# Patient Record
Sex: FEMALE | Race: OTHER | HISPANIC OR LATINO
[De-identification: names, ages, dates, MRNs, and addresses within clinical notes are randomized per-mention and may not be internally consistent; named-entity substitution may affect disease eponyms.]

---

## 2022-03-09 PROBLEM — Z00.00 ENCOUNTER FOR PREVENTIVE HEALTH EXAMINATION: Status: ACTIVE | Noted: 2022-03-09

## 2022-03-28 ENCOUNTER — NON-APPOINTMENT (OUTPATIENT)
Age: 82
End: 2022-03-28

## 2022-03-28 ENCOUNTER — APPOINTMENT (OUTPATIENT)
Dept: CARDIOTHORACIC SURGERY | Facility: CLINIC | Age: 82
End: 2022-03-28
Payer: MEDICARE

## 2022-03-28 ENCOUNTER — OUTPATIENT (OUTPATIENT)
Dept: OUTPATIENT SERVICES | Facility: HOSPITAL | Age: 82
LOS: 1 days | End: 2022-03-28
Payer: MEDICARE

## 2022-03-28 ENCOUNTER — APPOINTMENT (OUTPATIENT)
Dept: CT IMAGING | Facility: HOSPITAL | Age: 82
End: 2022-03-28

## 2022-03-28 VITALS
WEIGHT: 136 LBS | HEART RATE: 88 BPM | BODY MASS INDEX: 28.55 KG/M2 | DIASTOLIC BLOOD PRESSURE: 71 MMHG | HEIGHT: 58 IN | SYSTOLIC BLOOD PRESSURE: 169 MMHG | TEMPERATURE: 97.6 F | OXYGEN SATURATION: 94 % | RESPIRATION RATE: 17 BRPM

## 2022-03-28 DIAGNOSIS — Z86.79 PERSONAL HISTORY OF OTHER DISEASES OF THE CIRCULATORY SYSTEM: ICD-10-CM

## 2022-03-28 DIAGNOSIS — Z78.9 OTHER SPECIFIED HEALTH STATUS: ICD-10-CM

## 2022-03-28 LAB — POCT ISTAT CREATININE: 0.7 MG/DL — SIGNIFICANT CHANGE UP (ref 0.5–1.3)

## 2022-03-28 PROCEDURE — 99203 OFFICE O/P NEW LOW 30 MIN: CPT

## 2022-03-28 PROCEDURE — 75573 CT HRT C+ STRUX CGEN HRT DS: CPT

## 2022-03-28 PROCEDURE — 75573 CT HRT C+ STRUX CGEN HRT DS: CPT | Mod: 26

## 2022-03-28 PROCEDURE — 82565 ASSAY OF CREATININE: CPT

## 2022-03-28 PROCEDURE — 74174 CTA ABD&PLVS W/CONTRAST: CPT | Mod: 26

## 2022-03-28 PROCEDURE — 74174 CTA ABD&PLVS W/CONTRAST: CPT

## 2022-03-29 PROBLEM — Z78.9 DOES NOT USE TOBACCO: Status: ACTIVE | Noted: 2022-03-29

## 2022-03-29 PROBLEM — Z86.79 HISTORY OF HYPERTENSION: Status: RESOLVED | Noted: 2022-03-29 | Resolved: 2022-03-29

## 2022-03-29 RX ORDER — MELOXICAM 7.5 MG/1
7.5 TABLET ORAL
Refills: 0 | Status: ACTIVE | COMMUNITY

## 2022-03-29 RX ORDER — NIFEDIPINE 90 MG
90 TABLET, EXTENDED RELEASE ORAL DAILY
Refills: 0 | Status: ACTIVE | COMMUNITY

## 2022-03-29 RX ORDER — ASPIRIN 81 MG
81 TABLET, DELAYED RELEASE (ENTERIC COATED) ORAL DAILY
Refills: 0 | Status: ACTIVE | COMMUNITY

## 2022-03-30 NOTE — REVIEW OF SYSTEMS
[SOB] : shortness of breath [Dyspnea on exertion] : dyspnea during exertion [Chest Discomfort] : no chest discomfort [Lower Ext Edema] : lower extremity edema [Leg Claudication] : no intermittent leg claudication [Palpitations] : no palpitations [Orthopnea] : no orthopnea [PND] : no PND [Syncope] : no syncope [Negative] : Heme/Lymph

## 2022-03-30 NOTE — PHYSICAL EXAM
[Well Developed] : well developed [Well Nourished] : well nourished [No Acute Distress] : no acute distress [Normal Conjunctiva] : normal conjunctiva [Normal S1, S2] : normal S1, S2 [Clear Lung Fields] : clear lung fields [Good Air Entry] : good air entry [No Respiratory Distress] : no respiratory distress  [Soft] : abdomen soft [Non Tender] : non-tender [No Masses/organomegaly] : no masses/organomegaly [Normal Bowel Sounds] : normal bowel sounds [Normal Gait] : normal gait [No Edema] : no edema [No Cyanosis] : no cyanosis [No Clubbing] : no clubbing [No Varicosities] : no varicosities [Moves all extremities] : moves all extremities [No Focal Deficits] : no focal deficits [Normal Speech] : normal speech [Alert and Oriented] : alert and oriented [Normal memory] : normal memory [de-identified] : pleasant elderly woman [de-identified] : +CHELO grade III/VI heard best at [facility]

## 2022-03-30 NOTE — HISTORY OF PRESENT ILLNESS
[FreeTextEntry1] : \par 81 year old Monegasque-speaking Female with newly diagnosed severe AS, presented to her cardiologist, Dr. Go Curiel, with complaints of worsening ROE, fatigue, dizziness and near-syncope, and reduced exercise capacity. She can walk 1/2 block before needing to stop and rest. She also admits to occasional chest tightness, described as pressure. An echocardiogram performed 3/7/22 revealed severe AS with peak velocity 4m/s and ROSAURA 0.8cm2. . \par \par Patient seen with her niece, Pacific  used ID# [id number]. Patient reports intermittent SOB, worse with ambulation, needs to rest after 1/2 block. Reports being very active pre-pandemic, but now has not been very active at home. Also admits to LE edema bilaterally, however reports this is chronic. She states she used to take "a diuretic" (cannot recall which one), but stopped taking it because she felt it did not help the edema. Patient otherwise denies chest pain at this time, SOB at rest, orthopnea, worsening LE edema or weight gain, dizziness or pre-syncope. \par \par Patient ambulates with a cane, and lives at home with her niece and cousin. Her niece is her home health aid and helps the patient with ADLs.

## 2022-04-01 NOTE — PHYSICAL EXAM
[Well Developed] : well developed [Well Nourished] : well nourished [No Acute Distress] : no acute distress [Normal Conjunctiva] : normal conjunctiva [Normal S1, S2] : normal S1, S2 [Clear Lung Fields] : clear lung fields [Good Air Entry] : good air entry [No Respiratory Distress] : no respiratory distress  [Soft] : abdomen soft [Non Tender] : non-tender [No Masses/organomegaly] : no masses/organomegaly [Normal Bowel Sounds] : normal bowel sounds [Normal Gait] : normal gait [No Edema] : no edema [No Cyanosis] : no cyanosis [No Clubbing] : no clubbing [No Varicosities] : no varicosities [Moves all extremities] : moves all extremities [No Focal Deficits] : no focal deficits [Normal Speech] : normal speech [Alert and Oriented] : alert and oriented [Normal memory] : normal memory [de-identified] : +CHELO grade III/VI heard best at [facility] [de-identified] : pleasant elderly woman

## 2022-04-01 NOTE — HISTORY OF PRESENT ILLNESS
[FreeTextEntry1] : \par 81 year old Haitian-speaking Female with newly diagnosed severe AS, presented to her cardiologist, Dr. Go Curiel, with complaints of worsening ROE, fatigue, dizziness and near-syncope, and reduced exercise capacity. She can walk 1/2 block before needing to stop and rest. She also admits to occasional chest tightness, described as pressure. An echocardiogram performed 3/7/22 revealed severe AS with peak velocity 4m/s and ROSAURA 0.8cm2. Patient referred to [facility], Dr. Mcdermott, for further evaluation of her AS. \par \par Patient seen with her niece, Pacific  used ID# [id number]. Patient reports intermittent SOB, worse with ambulation, needs to rest after 1/2 block. Reports being very active pre-pandemic, but now has not been very active at home. Also admits to LE edema bilaterally, however reports this is chronic. She states she used to take "a diuretic" (cannot recall which one), but stopped taking it because she felt it did not help the edema. Patient otherwise denies chest pain at this time, SOB at rest, orthopnea, worsening LE edema or weight gain, dizziness or pre-syncope. \par \par Patient ambulates with a cane, and lives at home with her niece and cousin. Her niece is her home health aid and helps the patient with ADLs.

## 2022-04-07 VITALS
RESPIRATION RATE: 16 BRPM | HEIGHT: 55 IN | TEMPERATURE: 98 F | WEIGHT: 138.89 LBS | HEART RATE: 87 BPM | SYSTOLIC BLOOD PRESSURE: 176 MMHG | DIASTOLIC BLOOD PRESSURE: 72 MMHG | OXYGEN SATURATION: 97 %

## 2022-04-07 RX ORDER — CHLORHEXIDINE GLUCONATE 213 G/1000ML
1 SOLUTION TOPICAL ONCE
Refills: 0 | Status: DISCONTINUED | OUTPATIENT
Start: 2022-04-12 | End: 2022-04-27

## 2022-04-07 NOTE — H&P ADULT - NSHPLABSRESULTS_GEN_ALL_CORE
12.0   10.05 )-----------( 344      ( 12 Apr 2022 14:37 )             36.2       04-12    136  |  100  |  12  ----------------------------<  102<H>  4.0   |  25  |  0.75    Ca    9.4      12 Apr 2022 14:37    TPro  8.3  /  Alb  3.7  /  TBili  0.2  /  DBili  x   /  AST  23  /  ALT  13  /  AlkPhos  108  04-12      PT/INR - ( 12 Apr 2022 14:37 )   PT: 12.2 sec;   INR: 1.02          PTT - ( 12 Apr 2022 14:37 )  PTT:30.0 sec    CARDIAC MARKERS ( 12 Apr 2022 14:37 )  x     / x     / 113 U/L / x     / 1.5 ng/mL      EKG: sinus rhythm 38bpm, PACs, no acute ischemia

## 2022-04-07 NOTE — H&P ADULT - NSICDXPASTSURGICALHX_GEN_ALL_CORE_FT
PAST SURGICAL HISTORY:  Fixation hardware in leg History of leg surgery with metal placed     PAST SURGICAL HISTORY:  Fixation hardware in leg History of leg surgery with metal placed    History of

## 2022-04-07 NOTE — H&P ADULT - HISTORY OF PRESENT ILLNESS
COVID:  Cardiologist: Dr. Stiles/Dr. Mcdermott  Pharmacy:  Escort:     **skeleton**    82 yo Paraguayan speaking female with PMHx of HTN, dCHF, newly diagnosed severe AS, chronic b/l LE edema presented to her cardiologist, Dr. Stiles complaints of worsening ROE, fatigue, dizzines and near-syncope, and reduced exercise capacity. She can walk 1/2 block before needing to stop and rest. Associate with occasional chest tightness/pressure. Denies CP, SOB, orthopnea, PND, worsening LE edema, palpitations, dizziness. ECHO 3/7/22: severe AS with peak velocity 4m/s and ROSAURA 0.8cm2, concentric LVH with normal LV function, mild MR, AR, TR. CT heart 3/28/22: severe stenosis pLAD, dense calcific plaque noted in pRCA, calcified trileaflet aortic valve. CT Angio Abdomen and pelvis 3/28/2022: aortic valvular calcification, extensive calcific atherosclerotic disease of the aorta. Mild stenosis of R ICA. Cholelithiasis. CBD stent in place.     In light of pt's risk factors, CCS Anginal Class Equivalent III symptoms, pt referred for R/LHC for pre-op clearance for planned TAVR.    Covid: 4/8/22: [facility] /Clinic -patient will bring results    Cardiologist: Dr. Stiles   Pharmacy: [facility]- [address] [facility]   Escort: Niece      Patient will bring meds- Verify meds      82 yo Uzbek speaking female with PMHx of HTN, Hyperlipidemia, Diastolic CHF (LVEF 60% by echo 3/7/22), newly diagnosed severe AS, chronic b/l LE edema, Glaucoma, Nephrolithiasis s/p Lithotripsy, Arthritis, ? Vertigo who presented to her cardiologist, Dr. Stiles complaining of worsening ROE, fatigue, dizziness, near-syncope, and reduced exercise capacity. Patient reports ROE after ambulating ½ block relieved with rest with occasional chest tightness /pressure (CCS Angina Class III Equivalent Symptoms). Symptoms have been present for 3 months and occur primarily with exertion but she admits to occasional SOB at rest with laying down as well. Patient reports dizziness but denies orthopnea, PND, palpitations, diaphoresis, N/V, syncope, fever, chills, cough. She admits to chronic BLE edema for which she was previously on a diuretic (she does not recall which one-appears to be Furosemide per Surescripts) but stopped the medication as she felt it did not help the edema. Patient was referred to Dr. Mcdermott for further evaluation of AS and TAVR work-up.      ECHO 3/7/22: severe AS with peak velocity 4m/s and ROSAURA 0.8cm2, Aortic gradient 60 mm Hg, short axis views highly suggestive of bicuspid valve, concentric LVH with normal LV function, mitral annular calcification, mild MR, AR, TR. CT heart Congenital 3/28/22: severe stenosis pLAD, dense calcific plaque noted in pRCA, calcified trileaflet aortic valve. CT Angio Abdomen and pelvis 3/28/2022: aortic valvular calcification, extensive calcific atherosclerotic disease of the aorta. Mild stenosis of R ICA. Cholelithiasis. CBD stent in place.      In light of pt's risk factors, CCS Angina Class III Equivalent symptoms, and newly diagnosed severe AS pt referred for [facility] as part of pre-op work-up for TAVR.     Covid: 4/8/22: [facility] /Clinic -patient will bring results    Cardiologist: Dr. Stlies   Pharmacy: [facility]- [address] [facility]   Escort: Niece      Patient will bring meds- Verify meds      NEEDS ECHO AND CAROTID US: PRE OP FOR TAVR (BOTH ORDERED)    80 yo Irish speaking female with PMHx of HTN, Hyperlipidemia, Diastolic CHF (LVEF 60% by echo 3/7/22), newly diagnosed severe AS, chronic b/l LE edema, Glaucoma, Nephrolithiasis s/p Lithotripsy, Arthritis, ? Vertigo who presented to her cardiologist, Dr. Stiles complaining of worsening ROE, fatigue, dizziness, near-syncope, and reduced exercise capacity. Patient reports ROE after ambulating ½ block relieved with rest with occasional chest tightness /pressure (CCS Angina Class III Equivalent Symptoms). Symptoms have been present for 3 months and occur primarily with exertion but she admits to occasional SOB at rest with laying down as well. Patient reports dizziness but denies orthopnea, PND, palpitations, diaphoresis, N/V, syncope, fever, chills, cough. She admits to chronic BLE edema for which she was previously on a diuretic (she does not recall which one-appears to be Furosemide per Surescripts) but stopped the medication as she felt it did not help the edema. Patient was referred to Dr. Mcdermott for further evaluation of AS and TAVR work-up.      ECHO 3/7/22: severe AS with peak velocity 4m/s and ROSAURA 0.8cm2, Aortic gradient 60 mm Hg, short axis views highly suggestive of bicuspid valve, concentric LVH with normal LV function, mitral annular calcification, mild MR, AR, TR. CT heart Congenital 3/28/22: severe stenosis pLAD, dense calcific plaque noted in pRCA, calcified trileaflet aortic valve. CT Angio Abdomen and pelvis 3/28/2022: aortic valvular calcification, extensive calcific atherosclerotic disease of the aorta. Mild stenosis of R ICA. Cholelithiasis. CBD stent in place.      In light of pt's risk factors, CCS Angina Class III Equivalent symptoms, and newly diagnosed severe AS pt referred for [facility] as part of pre-op work-up for TAVR.     Covid: negative 4/8/22    Cardiologist: Dr. Stiles   Pharmacy: [facility]- [address] [place] [address]; meds confirmed with pill bottles at bedside  Escort: Niece      82 yo Yoruba speaking female with PMHx of HTN, Hyperlipidemia, Diastolic CHF (LVEF 60% by echo 3/7/22), newly diagnosed severe AS, chronic b/l LE edema, who presented to her cardiologist, Dr. Stiles complaining of worsening ROE, fatigue, dizziness, near-syncope, and reduced exercise capacity. Patient reports ROE after ambulating ½ block relieved with rest with occasional chest tightness /pressure (CCS Angina Class III Equivalent Symptoms). Symptoms have been present for 3 months and occur primarily with exertion but she admits to occasional SOB at rest with laying down as well. Patient reports dizziness but denies orthopnea, PND, palpitations, diaphoresis, N/V, syncope, fever, chills, cough. She admits to chronic BLE edema for which she was previously on a diuretic (she does not recall which one-appears to be Furosemide per Surescripts) but stopped the medication as she felt it did not help the edema. Patient was referred to Dr. Mcdermott for further evaluation of AS and TAVR work-up.      ECHO 3/7/22: severe AS with peak velocity 4m/s and ROSAURA 0.8cm2, Aortic gradient 60 mm Hg, short axis views highly suggestive of bicuspid valve, concentric LVH with normal LV function, mitral annular calcification, mild MR, AR, TR. CT heart Congenital 3/28/22: severe stenosis pLAD, dense calcific plaque noted in pRCA, calcified trileaflet aortic valve. CT Angio Abdomen and pelvis 3/28/2022: aortic valvular calcification, extensive calcific atherosclerotic disease of the aorta. Mild stenosis of R ICA. Cholelithiasis. CBD stent in place.      In light of pt's risk factors, CCS Angina Class III Equivalent symptoms, and newly diagnosed severe AS pt referred for [facility] as part of pre-op work-up for TAVR.

## 2022-04-07 NOTE — H&P ADULT - ASSESSMENT
80 yo Ivorian speaking female with PMHx of HTN, Hyperlipidemia, Diastolic CHF (LVEF 60% by echo 3/7/22), newly diagnosed severe AS, chronic b/l LE edema, who presents for R/LHC in light of pt's risk factors, CCS Angina Class III Equivalent symptoms, and newly diagnosed severe AS with plan for TAVR.     -ASA IV, Mallampati IV  -suitable candidate for moderate sedation  -DAPT: continue aspirin 81mg daily. No plavix load 2/2 plan for TAVR  -IVF: none 2/2 severe AS  -cath consent obtained via Language Line ID # [id number]    Risks & benefits of procedure and alternative therapy have been explained to the patient including but not limited to: allergic reaction, bleeding w/possible need for blood transfusion, infection, renal and vascular compromise, limb damage, arrhythmia, stroke, vessel dissection/perforation, Myocardial infarction, emergent CABG.

## 2022-04-07 NOTE — H&P ADULT - NSICDXPASTMEDICALHX_GEN_ALL_CORE_FT
PAST MEDICAL HISTORY:  Aortic stenosis, severe     Diastolic CHF     HTN (hypertension)      PAST MEDICAL HISTORY:  Aortic stenosis, severe     Arthritis     Diastolic CHF     Glaucoma     HTN (hypertension)     Nephrolithiasis

## 2022-04-12 ENCOUNTER — OUTPATIENT (OUTPATIENT)
Dept: OUTPATIENT SERVICES | Facility: HOSPITAL | Age: 82
LOS: 1 days | Discharge: HOME | End: 2022-04-12
Payer: MEDICARE

## 2022-04-12 DIAGNOSIS — Z96.7 PRESENCE OF OTHER BONE AND TENDON IMPLANTS: Chronic | ICD-10-CM

## 2022-04-12 DIAGNOSIS — Z98.891 HISTORY OF UTERINE SCAR FROM PREVIOUS SURGERY: Chronic | ICD-10-CM

## 2022-04-12 LAB
A1C WITH ESTIMATED AVERAGE GLUCOSE RESULT: 6.2 % — HIGH (ref 4–5.6)
ALBUMIN SERPL ELPH-MCNC: 3.7 G/DL — SIGNIFICANT CHANGE UP (ref 3.3–5)
ALP SERPL-CCNC: 108 U/L — SIGNIFICANT CHANGE UP (ref 40–120)
ALT FLD-CCNC: 13 U/L — SIGNIFICANT CHANGE UP (ref 10–45)
ANION GAP SERPL CALC-SCNC: 11 MMOL/L — SIGNIFICANT CHANGE UP (ref 5–17)
APTT BLD: 30 SEC — SIGNIFICANT CHANGE UP (ref 27.5–35.5)
AST SERPL-CCNC: 23 U/L — SIGNIFICANT CHANGE UP (ref 10–40)
BASOPHILS # BLD AUTO: 0.09 K/UL — SIGNIFICANT CHANGE UP (ref 0–0.2)
BASOPHILS NFR BLD AUTO: 0.9 % — SIGNIFICANT CHANGE UP (ref 0–2)
BILIRUB SERPL-MCNC: 0.2 MG/DL — SIGNIFICANT CHANGE UP (ref 0.2–1.2)
BUN SERPL-MCNC: 12 MG/DL — SIGNIFICANT CHANGE UP (ref 7–23)
CALCIUM SERPL-MCNC: 9.4 MG/DL — SIGNIFICANT CHANGE UP (ref 8.4–10.5)
CHLORIDE SERPL-SCNC: 100 MMOL/L — SIGNIFICANT CHANGE UP (ref 96–108)
CHOLEST SERPL-MCNC: 224 MG/DL — HIGH
CK MB CFR SERPL CALC: 1.5 NG/ML — SIGNIFICANT CHANGE UP (ref 0–6.7)
CK SERPL-CCNC: 113 U/L — SIGNIFICANT CHANGE UP (ref 25–170)
CO2 SERPL-SCNC: 25 MMOL/L — SIGNIFICANT CHANGE UP (ref 22–31)
CREAT SERPL-MCNC: 0.75 MG/DL — SIGNIFICANT CHANGE UP (ref 0.5–1.3)
EGFR: 79 ML/MIN/1.73M2 — SIGNIFICANT CHANGE UP
EOSINOPHIL # BLD AUTO: 0.1 K/UL — SIGNIFICANT CHANGE UP (ref 0–0.5)
EOSINOPHIL NFR BLD AUTO: 1 % — SIGNIFICANT CHANGE UP (ref 0–6)
ESTIMATED AVERAGE GLUCOSE: 131 MG/DL — HIGH (ref 68–114)
GLUCOSE SERPL-MCNC: 102 MG/DL — HIGH (ref 70–99)
HCT VFR BLD CALC: 36.2 % — SIGNIFICANT CHANGE UP (ref 34.5–45)
HDLC SERPL-MCNC: 61 MG/DL — SIGNIFICANT CHANGE UP
HGB BLD-MCNC: 12 G/DL — SIGNIFICANT CHANGE UP (ref 11.5–15.5)
IMM GRANULOCYTES NFR BLD AUTO: 0.2 % — SIGNIFICANT CHANGE UP (ref 0–1.5)
INR BLD: 1.02 — SIGNIFICANT CHANGE UP (ref 0.88–1.16)
LIPID PNL WITH DIRECT LDL SERPL: 138 MG/DL — HIGH
LYMPHOCYTES # BLD AUTO: 2.81 K/UL — SIGNIFICANT CHANGE UP (ref 1–3.3)
LYMPHOCYTES # BLD AUTO: 28 % — SIGNIFICANT CHANGE UP (ref 13–44)
MCHC RBC-ENTMCNC: 28.8 PG — SIGNIFICANT CHANGE UP (ref 27–34)
MCHC RBC-ENTMCNC: 33.1 GM/DL — SIGNIFICANT CHANGE UP (ref 32–36)
MCV RBC AUTO: 87 FL — SIGNIFICANT CHANGE UP (ref 80–100)
MONOCYTES # BLD AUTO: 1.02 K/UL — HIGH (ref 0–0.9)
MONOCYTES NFR BLD AUTO: 10.1 % — SIGNIFICANT CHANGE UP (ref 2–14)
NEUTROPHILS # BLD AUTO: 6.01 K/UL — SIGNIFICANT CHANGE UP (ref 1.8–7.4)
NEUTROPHILS NFR BLD AUTO: 59.8 % — SIGNIFICANT CHANGE UP (ref 43–77)
NON HDL CHOLESTEROL: 163 MG/DL — HIGH
NRBC # BLD: 0 /100 WBCS — SIGNIFICANT CHANGE UP (ref 0–0)
PLATELET # BLD AUTO: 344 K/UL — SIGNIFICANT CHANGE UP (ref 150–400)
POTASSIUM SERPL-MCNC: 4 MMOL/L — SIGNIFICANT CHANGE UP (ref 3.5–5.3)
POTASSIUM SERPL-SCNC: 4 MMOL/L — SIGNIFICANT CHANGE UP (ref 3.5–5.3)
PROT SERPL-MCNC: 8.3 G/DL — SIGNIFICANT CHANGE UP (ref 6–8.3)
PROTHROM AB SERPL-ACNC: 12.2 SEC — SIGNIFICANT CHANGE UP (ref 10.5–13.4)
RBC # BLD: 4.16 M/UL — SIGNIFICANT CHANGE UP (ref 3.8–5.2)
RBC # FLD: 13.3 % — SIGNIFICANT CHANGE UP (ref 10.3–14.5)
SODIUM SERPL-SCNC: 136 MMOL/L — SIGNIFICANT CHANGE UP (ref 135–145)
TRIGL SERPL-MCNC: 127 MG/DL — SIGNIFICANT CHANGE UP
WBC # BLD: 10.05 K/UL — SIGNIFICANT CHANGE UP (ref 3.8–10.5)
WBC # FLD AUTO: 10.05 K/UL — SIGNIFICANT CHANGE UP (ref 3.8–10.5)

## 2022-04-12 PROCEDURE — 93010 ELECTROCARDIOGRAM REPORT: CPT | Mod: 59

## 2022-04-12 PROCEDURE — 80053 COMPREHEN METABOLIC PANEL: CPT

## 2022-04-12 PROCEDURE — 99153 MOD SED SAME PHYS/QHP EA: CPT

## 2022-04-12 PROCEDURE — 85610 PROTHROMBIN TIME: CPT

## 2022-04-12 PROCEDURE — 85025 COMPLETE CBC W/AUTO DIFF WBC: CPT

## 2022-04-12 PROCEDURE — 99152 MOD SED SAME PHYS/QHP 5/>YRS: CPT

## 2022-04-12 PROCEDURE — 80061 LIPID PANEL: CPT

## 2022-04-12 PROCEDURE — 93460 R&L HRT ART/VENTRICLE ANGIO: CPT

## 2022-04-12 PROCEDURE — C1887: CPT

## 2022-04-12 PROCEDURE — 93005 ELECTROCARDIOGRAM TRACING: CPT

## 2022-04-12 PROCEDURE — 82553 CREATINE MB FRACTION: CPT

## 2022-04-12 PROCEDURE — C1760: CPT

## 2022-04-12 PROCEDURE — 85730 THROMBOPLASTIN TIME PARTIAL: CPT

## 2022-04-12 PROCEDURE — 93460 R&L HRT ART/VENTRICLE ANGIO: CPT | Mod: 26

## 2022-04-12 PROCEDURE — C1769: CPT

## 2022-04-12 PROCEDURE — C1894: CPT

## 2022-04-12 PROCEDURE — 83036 HEMOGLOBIN GLYCOSYLATED A1C: CPT

## 2022-04-12 PROCEDURE — 82550 ASSAY OF CK (CPK): CPT

## 2022-04-12 RX ORDER — LABETALOL HCL 100 MG
10 TABLET ORAL ONCE
Refills: 0 | Status: COMPLETED | OUTPATIENT
Start: 2022-04-12 | End: 2022-04-12

## 2022-04-12 RX ORDER — NETARSUDIL 0.2 MG/ML
1 SOLUTION/ DROPS OPHTHALMIC; TOPICAL
Qty: 0 | Refills: 0 | DISCHARGE

## 2022-04-12 RX ORDER — ATORVASTATIN CALCIUM 80 MG/1
1 TABLET, FILM COATED ORAL
Qty: 0 | Refills: 0 | DISCHARGE

## 2022-04-12 RX ORDER — CARVEDILOL PHOSPHATE 80 MG/1
1 CAPSULE, EXTENDED RELEASE ORAL
Qty: 28 | Refills: 0
Start: 2022-04-12 | End: 2022-04-25

## 2022-04-12 RX ORDER — FUROSEMIDE 40 MG
1 TABLET ORAL
Qty: 0 | Refills: 0 | DISCHARGE

## 2022-04-12 RX ORDER — CELECOXIB 200 MG/1
1 CAPSULE ORAL
Qty: 0 | Refills: 0 | DISCHARGE

## 2022-04-12 RX ORDER — TEMAZEPAM 15 MG/1
1 CAPSULE ORAL
Qty: 0 | Refills: 0 | DISCHARGE

## 2022-04-12 RX ORDER — MECLIZINE HCL 12.5 MG
1 TABLET ORAL
Qty: 0 | Refills: 0 | DISCHARGE

## 2022-04-12 RX ORDER — DORZOLAMIDE HYDROCHLORIDE TIMOLOL MALEATE 20; 5 MG/ML; MG/ML
1 SOLUTION/ DROPS OPHTHALMIC
Qty: 0 | Refills: 0 | DISCHARGE

## 2022-04-12 RX ORDER — CARVEDILOL PHOSPHATE 80 MG/1
12.5 CAPSULE, EXTENDED RELEASE ORAL ONCE
Refills: 0 | Status: COMPLETED | OUTPATIENT
Start: 2022-04-12 | End: 2022-04-12

## 2022-04-12 RX ORDER — MELOXICAM 15 MG/1
1 TABLET ORAL
Qty: 0 | Refills: 0 | DISCHARGE

## 2022-04-12 RX ORDER — ASPIRIN/CALCIUM CARB/MAGNESIUM 324 MG
81 TABLET ORAL ONCE
Refills: 0 | Status: DISCONTINUED | OUTPATIENT
Start: 2022-04-12 | End: 2022-04-27

## 2022-04-12 RX ORDER — ALBUTEROL 90 UG/1
3 AEROSOL, METERED ORAL
Qty: 0 | Refills: 0 | DISCHARGE

## 2022-04-12 RX ADMIN — CARVEDILOL PHOSPHATE 12.5 MILLIGRAM(S): 80 CAPSULE, EXTENDED RELEASE ORAL at 21:35

## 2022-04-12 RX ADMIN — Medication 10 MILLIGRAM(S): at 20:32

## 2022-04-12 NOTE — PROGRESS NOTE ADULT - SUBJECTIVE AND OBJECTIVE BOX
Interventional Cardiology PA SDA Discharge Note    Patient without complaints. Ambulated and voided without difficulties    Afebrile, VSS    Ext:    		        Left             Groin:   no    hematoma, no    bruit, dressing; C/D/I  		        Right             Radial :  no  hematoma,  no   bleeding, dressing; C/D/I      Pulses:    intact RAD/DP/PT to baseline     A/P:  80 yo British speaking female with PMHx of HTN, Hyperlipidemia, Diastolic CHF (LVEF 60% by echo 3/7/22), newly diagnosed severe AS, chronic b/l LE edema, who presents for R/LHC in light of pt's risk factors, CCS Angina Class III Equivalent symptoms, and newly diagnosed severe AS with plan for TAVR.     s/p cardiac catheterization 4/12/22: (C) LM normal, pLAD 40%, D1 70%, dLCx 50%, oRCA 70-80%. (C) RA 6, RV 48/8, RA 49/16 (30), PASP 18 (large V wave), LV-AO p2pg 60, LV-Ao 53.89, ROSAURA 0.45, AoSat 98.1, PA sat 75.8, Co 4.15, CI 2.81. R TR @ 8pm, R brachial 14G IV, L Vascade.      1.	Stable for discharge as per attending Dr. Zee after bed rest, pt voids, groin/wrist stable and 30 minutes of ambulation.  2.	Follow-up with PMD/Cardiologist Go in 1-2 weeks  3.	Discharged forms signed and copies in chart    Interventional Cardiology PA SDA Discharge Note    Patient without complaints. Ambulated and voided without difficulties    Afebrile, VSS    Ext:    		        Left             Groin:   no    hematoma, no    bruit, dressing; C/D/I  		        Right             Radial :  no  hematoma,  no   bleeding, dressing; C/D/I      Pulses:    intact RAD/DP/PT to baseline     A/P:  82 yo Monegasque speaking female with PMHx of HTN, Hyperlipidemia, Diastolic CHF (LVEF 60% by echo 3/7/22), newly diagnosed severe AS, chronic b/l LE edema, who presents for R/LHC in light of pt's risk factors, CCS Angina Class III Equivalent symptoms, and newly diagnosed severe AS with plan for TAVR.     s/p cardiac catheterization 4/12/22: ([facility]) LM normal, pLAD 40%, D1 70%, dLCx 50%, oRCA 70-80%. (C) RA 6, RV 48/8, RA 49/16 (30), PASP 18 (large V wave), LV-AO p2pg 60, LV-Ao 53.89, ROSAURA 0.45, AoSat 98.1, PA sat 75.8, Co 4.15, CI 2.81. R TR @ 8pm, R brachial 14G IV, L Vascade.    pt hypertensive post procedure. Per discussion with interventional team initiated Carvedilol 12.5mg PO BID. Sent prescription for 2 weeks of medication, and patient instructed that she needs to follow up very closely with Dr. Stiles in the next 1-3 days. Attending aware and will discuss with Dr. Stiles.     1.	Stable for discharge as per attending Dr. Zee after bed rest, pt voids, groin/wrist stable and 30 minutes of ambulation.  2.	Follow-up with PMD/Cardiologist Go in 1-3 days.  3.	Discharged forms signed and copies in chart

## 2022-04-14 PROBLEM — H40.9 UNSPECIFIED GLAUCOMA: Chronic | Status: ACTIVE | Noted: 2022-04-12

## 2022-04-14 PROBLEM — I10 ESSENTIAL (PRIMARY) HYPERTENSION: Chronic | Status: ACTIVE | Noted: 2022-04-07

## 2022-04-14 PROBLEM — I50.30 UNSPECIFIED DIASTOLIC (CONGESTIVE) HEART FAILURE: Chronic | Status: ACTIVE | Noted: 2022-04-07

## 2022-04-14 PROBLEM — N20.0 CALCULUS OF KIDNEY: Chronic | Status: ACTIVE | Noted: 2022-04-12

## 2022-04-14 PROBLEM — M19.90 UNSPECIFIED OSTEOARTHRITIS, UNSPECIFIED SITE: Chronic | Status: ACTIVE | Noted: 2022-04-12

## 2022-04-14 PROBLEM — I35.0 NONRHEUMATIC AORTIC (VALVE) STENOSIS: Chronic | Status: ACTIVE | Noted: 2022-04-07

## 2022-04-18 ENCOUNTER — LABORATORY RESULT (OUTPATIENT)
Age: 82
End: 2022-04-18

## 2022-04-19 ENCOUNTER — NON-APPOINTMENT (OUTPATIENT)
Age: 82
End: 2022-04-19

## 2022-04-19 ENCOUNTER — TRANSCRIPTION ENCOUNTER (OUTPATIENT)
Age: 82
End: 2022-04-19

## 2022-04-19 VITALS
HEIGHT: 58 IN | RESPIRATION RATE: 16 BRPM | DIASTOLIC BLOOD PRESSURE: 63 MMHG | SYSTOLIC BLOOD PRESSURE: 141 MMHG | HEART RATE: 72 BPM | TEMPERATURE: 97 F | OXYGEN SATURATION: 96 % | WEIGHT: 136.03 LBS

## 2022-04-19 RX ORDER — SODIUM CHLORIDE 9 MG/ML
3 INJECTION INTRAMUSCULAR; INTRAVENOUS; SUBCUTANEOUS EVERY 8 HOURS
Refills: 0 | Status: DISCONTINUED | OUTPATIENT
Start: 2022-04-20 | End: 2022-04-20

## 2022-04-19 RX ORDER — DICLOFENAC SODIUM 75 MG/1
1 TABLET, DELAYED RELEASE ORAL
Qty: 0 | Refills: 0 | DISCHARGE

## 2022-04-19 RX ORDER — ATORVASTATIN CALCIUM 80 MG/1
1 TABLET, FILM COATED ORAL
Qty: 0 | Refills: 0 | DISCHARGE

## 2022-04-19 RX ORDER — ESZOPICLONE 2 MG/1
1 TABLET, COATED ORAL
Qty: 0 | Refills: 0 | DISCHARGE

## 2022-04-19 RX ORDER — ASPIRIN/CALCIUM CARB/MAGNESIUM 324 MG
1 TABLET ORAL
Qty: 0 | Refills: 0 | DISCHARGE

## 2022-04-19 NOTE — PATIENT PROFILE ADULT - FALL HARM RISK - HARM RISK INTERVENTIONS

## 2022-04-20 ENCOUNTER — APPOINTMENT (OUTPATIENT)
Dept: CARDIOTHORACIC SURGERY | Facility: HOSPITAL | Age: 82
End: 2022-04-20

## 2022-04-20 ENCOUNTER — INPATIENT (INPATIENT)
Facility: HOSPITAL | Age: 82
LOS: 0 days | Discharge: HOME | DRG: 267 | End: 2022-04-21
Attending: INTERNAL MEDICINE | Admitting: INTERNAL MEDICINE
Payer: MEDICARE

## 2022-04-20 ENCOUNTER — APPOINTMENT (OUTPATIENT)
Dept: CARDIOTHORACIC SURGERY | Facility: CLINIC | Age: 82
End: 2022-04-20
Payer: MEDICARE

## 2022-04-20 DIAGNOSIS — Z96.7 PRESENCE OF OTHER BONE AND TENDON IMPLANTS: Chronic | ICD-10-CM

## 2022-04-20 DIAGNOSIS — Z98.891 HISTORY OF UTERINE SCAR FROM PREVIOUS SURGERY: Chronic | ICD-10-CM

## 2022-04-20 LAB
ALBUMIN SERPL ELPH-MCNC: 3.1 G/DL — LOW (ref 3.3–5)
ALBUMIN SERPL ELPH-MCNC: 4 G/DL — SIGNIFICANT CHANGE UP (ref 3.3–5)
ALP SERPL-CCNC: 108 U/L — SIGNIFICANT CHANGE UP (ref 40–120)
ALP SERPL-CCNC: 87 U/L — SIGNIFICANT CHANGE UP (ref 40–120)
ALT FLD-CCNC: 19 U/L — SIGNIFICANT CHANGE UP (ref 10–45)
ALT FLD-CCNC: 26 U/L — SIGNIFICANT CHANGE UP (ref 10–45)
ANION GAP SERPL CALC-SCNC: 10 MMOL/L — SIGNIFICANT CHANGE UP (ref 5–17)
ANION GAP SERPL CALC-SCNC: 12 MMOL/L — SIGNIFICANT CHANGE UP (ref 5–17)
ANION GAP SERPL CALC-SCNC: 13 MMOL/L — SIGNIFICANT CHANGE UP (ref 5–17)
APTT BLD: 170.8 SEC — CRITICAL HIGH (ref 27.5–35.5)
APTT BLD: 28.7 SEC — SIGNIFICANT CHANGE UP (ref 27.5–35.5)
APTT BLD: >200 SEC — CRITICAL HIGH (ref 27.5–35.5)
AST SERPL-CCNC: 26 U/L — SIGNIFICANT CHANGE UP (ref 10–40)
AST SERPL-CCNC: 33 U/L — SIGNIFICANT CHANGE UP (ref 10–40)
BASOPHILS # BLD AUTO: 0.09 K/UL — SIGNIFICANT CHANGE UP (ref 0–0.2)
BASOPHILS NFR BLD AUTO: 0.6 % — SIGNIFICANT CHANGE UP (ref 0–2)
BILIRUB SERPL-MCNC: 0.3 MG/DL — SIGNIFICANT CHANGE UP (ref 0.2–1.2)
BILIRUB SERPL-MCNC: 0.3 MG/DL — SIGNIFICANT CHANGE UP (ref 0.2–1.2)
BLD GP AB SCN SERPL QL: NEGATIVE — SIGNIFICANT CHANGE UP
BUN SERPL-MCNC: 11 MG/DL — SIGNIFICANT CHANGE UP (ref 7–23)
BUN SERPL-MCNC: 12 MG/DL — SIGNIFICANT CHANGE UP (ref 7–23)
BUN SERPL-MCNC: 15 MG/DL — SIGNIFICANT CHANGE UP (ref 7–23)
CALCIUM SERPL-MCNC: 8.3 MG/DL — LOW (ref 8.4–10.5)
CALCIUM SERPL-MCNC: 8.4 MG/DL — SIGNIFICANT CHANGE UP (ref 8.4–10.5)
CALCIUM SERPL-MCNC: 9.4 MG/DL — SIGNIFICANT CHANGE UP (ref 8.4–10.5)
CHLORIDE SERPL-SCNC: 95 MMOL/L — LOW (ref 96–108)
CHLORIDE SERPL-SCNC: 98 MMOL/L — SIGNIFICANT CHANGE UP (ref 96–108)
CHLORIDE SERPL-SCNC: 99 MMOL/L — SIGNIFICANT CHANGE UP (ref 96–108)
CO2 SERPL-SCNC: 24 MMOL/L — SIGNIFICANT CHANGE UP (ref 22–31)
CO2 SERPL-SCNC: 24 MMOL/L — SIGNIFICANT CHANGE UP (ref 22–31)
CO2 SERPL-SCNC: 25 MMOL/L — SIGNIFICANT CHANGE UP (ref 22–31)
CREAT SERPL-MCNC: 0.68 MG/DL — SIGNIFICANT CHANGE UP (ref 0.5–1.3)
CREAT SERPL-MCNC: 0.71 MG/DL — SIGNIFICANT CHANGE UP (ref 0.5–1.3)
CREAT SERPL-MCNC: 0.82 MG/DL — SIGNIFICANT CHANGE UP (ref 0.5–1.3)
EGFR: 71 ML/MIN/1.73M2 — SIGNIFICANT CHANGE UP
EGFR: 84 ML/MIN/1.73M2 — SIGNIFICANT CHANGE UP
EGFR: 86 ML/MIN/1.73M2 — SIGNIFICANT CHANGE UP
EOSINOPHIL # BLD AUTO: 0.15 K/UL — SIGNIFICANT CHANGE UP (ref 0–0.5)
EOSINOPHIL NFR BLD AUTO: 1.1 % — SIGNIFICANT CHANGE UP (ref 0–6)
GAS PNL BLDA: SIGNIFICANT CHANGE UP
GAS PNL BLDA: SIGNIFICANT CHANGE UP
GLUCOSE SERPL-MCNC: 124 MG/DL — HIGH (ref 70–99)
GLUCOSE SERPL-MCNC: 129 MG/DL — HIGH (ref 70–99)
GLUCOSE SERPL-MCNC: 146 MG/DL — HIGH (ref 70–99)
HCT VFR BLD CALC: 25.9 % — LOW (ref 34.5–45)
HCT VFR BLD CALC: 26.9 % — LOW (ref 34.5–45)
HCT VFR BLD CALC: 31.8 % — LOW (ref 34.5–45)
HGB BLD-MCNC: 10.5 G/DL — LOW (ref 11.5–15.5)
HGB BLD-MCNC: 8.8 G/DL — LOW (ref 11.5–15.5)
HGB BLD-MCNC: 9.1 G/DL — LOW (ref 11.5–15.5)
IMM GRANULOCYTES NFR BLD AUTO: 0.5 % — SIGNIFICANT CHANGE UP (ref 0–1.5)
INR BLD: 1.05 — SIGNIFICANT CHANGE UP (ref 0.88–1.16)
INR BLD: 1.33 — HIGH (ref 0.88–1.16)
INR BLD: 1.5 — HIGH (ref 0.88–1.16)
LYMPHOCYTES # BLD AUTO: 1.45 K/UL — SIGNIFICANT CHANGE UP (ref 1–3.3)
LYMPHOCYTES # BLD AUTO: 10.4 % — LOW (ref 13–44)
MAGNESIUM SERPL-MCNC: 1.9 MG/DL — SIGNIFICANT CHANGE UP (ref 1.6–2.6)
MAGNESIUM SERPL-MCNC: 1.9 MG/DL — SIGNIFICANT CHANGE UP (ref 1.6–2.6)
MCHC RBC-ENTMCNC: 28.7 PG — SIGNIFICANT CHANGE UP (ref 27–34)
MCHC RBC-ENTMCNC: 29.4 PG — SIGNIFICANT CHANGE UP (ref 27–34)
MCHC RBC-ENTMCNC: 29.7 PG — SIGNIFICANT CHANGE UP (ref 27–34)
MCHC RBC-ENTMCNC: 33 GM/DL — SIGNIFICANT CHANGE UP (ref 32–36)
MCHC RBC-ENTMCNC: 33.8 GM/DL — SIGNIFICANT CHANGE UP (ref 32–36)
MCHC RBC-ENTMCNC: 34 GM/DL — SIGNIFICANT CHANGE UP (ref 32–36)
MCV RBC AUTO: 86.9 FL — SIGNIFICANT CHANGE UP (ref 80–100)
MCV RBC AUTO: 87.1 FL — SIGNIFICANT CHANGE UP (ref 80–100)
MCV RBC AUTO: 87.5 FL — SIGNIFICANT CHANGE UP (ref 80–100)
MONOCYTES # BLD AUTO: 1.06 K/UL — HIGH (ref 0–0.9)
MONOCYTES NFR BLD AUTO: 7.6 % — SIGNIFICANT CHANGE UP (ref 2–14)
NEUTROPHILS # BLD AUTO: 11.11 K/UL — HIGH (ref 1.8–7.4)
NEUTROPHILS NFR BLD AUTO: 79.8 % — HIGH (ref 43–77)
NRBC # BLD: 0 /100 WBCS — SIGNIFICANT CHANGE UP (ref 0–0)
NT-PROBNP SERPL-SCNC: 3107 PG/ML — HIGH (ref 0–300)
PLATELET # BLD AUTO: 284 K/UL — SIGNIFICANT CHANGE UP (ref 150–400)
PLATELET # BLD AUTO: 288 K/UL — SIGNIFICANT CHANGE UP (ref 150–400)
PLATELET # BLD AUTO: 316 K/UL — SIGNIFICANT CHANGE UP (ref 150–400)
POTASSIUM SERPL-MCNC: 3.6 MMOL/L — SIGNIFICANT CHANGE UP (ref 3.5–5.3)
POTASSIUM SERPL-MCNC: 3.8 MMOL/L — SIGNIFICANT CHANGE UP (ref 3.5–5.3)
POTASSIUM SERPL-MCNC: 4.5 MMOL/L — SIGNIFICANT CHANGE UP (ref 3.5–5.3)
POTASSIUM SERPL-SCNC: 3.6 MMOL/L — SIGNIFICANT CHANGE UP (ref 3.5–5.3)
POTASSIUM SERPL-SCNC: 3.8 MMOL/L — SIGNIFICANT CHANGE UP (ref 3.5–5.3)
POTASSIUM SERPL-SCNC: 4.5 MMOL/L — SIGNIFICANT CHANGE UP (ref 3.5–5.3)
PROT SERPL-MCNC: 6.8 G/DL — SIGNIFICANT CHANGE UP (ref 6–8.3)
PROT SERPL-MCNC: 8.6 G/DL — HIGH (ref 6–8.3)
PROTHROM AB SERPL-ACNC: 12.5 SEC — SIGNIFICANT CHANGE UP (ref 10.5–13.4)
PROTHROM AB SERPL-ACNC: 15.9 SEC — HIGH (ref 10.5–13.4)
PROTHROM AB SERPL-ACNC: 17.9 SEC — HIGH (ref 10.5–13.4)
RBC # BLD: 2.96 M/UL — LOW (ref 3.8–5.2)
RBC # BLD: 3.09 M/UL — LOW (ref 3.8–5.2)
RBC # BLD: 3.66 M/UL — LOW (ref 3.8–5.2)
RBC # FLD: 12.7 % — SIGNIFICANT CHANGE UP (ref 10.3–14.5)
RBC # FLD: 12.7 % — SIGNIFICANT CHANGE UP (ref 10.3–14.5)
RBC # FLD: 12.9 % — SIGNIFICANT CHANGE UP (ref 10.3–14.5)
RH IG SCN BLD-IMP: POSITIVE — SIGNIFICANT CHANGE UP
RH IG SCN BLD-IMP: POSITIVE — SIGNIFICANT CHANGE UP
SODIUM SERPL-SCNC: 133 MMOL/L — LOW (ref 135–145)
SODIUM SERPL-SCNC: 133 MMOL/L — LOW (ref 135–145)
SODIUM SERPL-SCNC: 134 MMOL/L — LOW (ref 135–145)
TSH SERPL-MCNC: 3.84 UIU/ML — SIGNIFICANT CHANGE UP (ref 0.27–4.2)
WBC # BLD: 11.21 K/UL — HIGH (ref 3.8–10.5)
WBC # BLD: 13.12 K/UL — HIGH (ref 3.8–10.5)
WBC # BLD: 13.93 K/UL — HIGH (ref 3.8–10.5)
WBC # FLD AUTO: 11.21 K/UL — HIGH (ref 3.8–10.5)
WBC # FLD AUTO: 13.12 K/UL — HIGH (ref 3.8–10.5)
WBC # FLD AUTO: 13.93 K/UL — HIGH (ref 3.8–10.5)

## 2022-04-20 PROCEDURE — 93010 ELECTROCARDIOGRAM REPORT: CPT

## 2022-04-20 PROCEDURE — 71045 X-RAY EXAM CHEST 1 VIEW: CPT | Mod: 26

## 2022-04-20 PROCEDURE — 33361 REPLACE AORTIC VALVE PERQ: CPT | Mod: 62,Q0

## 2022-04-20 PROCEDURE — MCOT1: CPT | Mod: NC

## 2022-04-20 PROCEDURE — 93320 DOPPLER ECHO COMPLETE: CPT | Mod: 26,59

## 2022-04-20 PROCEDURE — 99231 SBSQ HOSP IP/OBS SF/LOW 25: CPT

## 2022-04-20 PROCEDURE — 76376 3D RENDER W/INTRP POSTPROCES: CPT | Mod: 26

## 2022-04-20 PROCEDURE — 93312 ECHO TRANSESOPHAGEAL: CPT | Mod: 26

## 2022-04-20 PROCEDURE — 99223 1ST HOSP IP/OBS HIGH 75: CPT | Mod: 57

## 2022-04-20 PROCEDURE — 93308 TTE F-UP OR LMTD: CPT | Mod: 26,59

## 2022-04-20 DEVICE — GUIDEWIRE TERUMO GLIDEWIRE STIFF STRAGHT .035" X 180CM: Type: IMPLANTABLE DEVICE | Status: FUNCTIONAL

## 2022-04-20 DEVICE — INTRO GWIRE 0.009-0.018IN: Type: IMPLANTABLE DEVICE | Status: FUNCTIONAL

## 2022-04-20 DEVICE — INTRO MICROPUNC 4FRX10CM SS: Type: IMPLANTABLE DEVICE | Status: FUNCTIONAL

## 2022-04-20 DEVICE — GWIRE GUID  0.035INX150CM: Type: IMPLANTABLE DEVICE | Status: FUNCTIONAL

## 2022-04-20 DEVICE — ANGIOSEAL VASC CLOS VIP 8FR: Type: IMPLANTABLE DEVICE | Status: FUNCTIONAL

## 2022-04-20 DEVICE — CATH GD EXT GUIDEZILLA II 6F: Type: IMPLANTABLE DEVICE | Status: FUNCTIONAL

## 2022-04-20 DEVICE — IMPLANTABLE DEVICE: Type: IMPLANTABLE DEVICE | Status: FUNCTIONAL

## 2022-04-20 DEVICE — GUIDEWIRE RUNTHROUGH NS 180CM: Type: IMPLANTABLE DEVICE | Status: FUNCTIONAL

## 2022-04-20 DEVICE — GUIDE LAUNCHER 6F JR4: Type: IMPLANTABLE DEVICE | Status: FUNCTIONAL

## 2022-04-20 DEVICE — GUIDEWIRE LUNDERQUIST EXTRA-STIFF DOUBLE CURVED .035" X 260CM: Type: IMPLANTABLE DEVICE | Status: FUNCTIONAL

## 2022-04-20 DEVICE — SHEATH INTRODUCER TERUMO PINNACLE CORONARY 8FR X 10CM X 0.038" MINI WIRE: Type: IMPLANTABLE DEVICE | Status: FUNCTIONAL

## 2022-04-20 DEVICE — SUT PERCLOSE PROGLIDE 6FR: Type: IMPLANTABLE DEVICE | Status: FUNCTIONAL

## 2022-04-20 DEVICE — WIREGUIDE TOROFLEX .018X40CM: Type: IMPLANTABLE DEVICE | Status: FUNCTIONAL

## 2022-04-20 DEVICE — GWIRE JTIP 1.5MM .035X180CM: Type: IMPLANTABLE DEVICE | Status: FUNCTIONAL

## 2022-04-20 DEVICE — GUIDEWIRE STANDARD STRAIGHT .035" X 180CM: Type: IMPLANTABLE DEVICE | Status: FUNCTIONAL

## 2022-04-20 DEVICE — INTRODUCER SHEATH KIT GLIDESHEATH SLENDER FLEX STRAIGHT 21G 6F X 10CM: Type: IMPLANTABLE DEVICE | Status: FUNCTIONAL

## 2022-04-20 DEVICE — SYS DELIVERY EVOLUT PROPLUS 23/26/29 MM: Type: IMPLANTABLE DEVICE | Status: FUNCTIONAL

## 2022-04-20 DEVICE — INTRODUCER SHEATH 0.038" X 14FR X 30CM: Type: IMPLANTABLE DEVICE | Status: FUNCTIONAL

## 2022-04-20 DEVICE — CATH DX AL1 INFIN 5FRX100CM: Type: IMPLANTABLE DEVICE | Status: FUNCTIONAL

## 2022-04-20 DEVICE — SHEATH INTRODUCER TERUMO PINNACLE CORONARY 5FR X 10CM X 0.038" MINI WIRE: Type: IMPLANTABLE DEVICE | Status: FUNCTIONAL

## 2022-04-20 DEVICE — INTRODUCER RAABE 6F X 55CM: Type: IMPLANTABLE DEVICE | Status: FUNCTIONAL

## 2022-04-20 DEVICE — LOADING SYSTEM EVOLUT PRO 23-29MM: Type: IMPLANTABLE DEVICE | Status: FUNCTIONAL

## 2022-04-20 DEVICE — CATH DX PIG 145 INFIN 5FRX110CM: Type: IMPLANTABLE DEVICE | Status: FUNCTIONAL

## 2022-04-20 DEVICE — WIRE GUIDE EXCHANGE J TIP 3MM X 260CM: Type: IMPLANTABLE DEVICE | Status: FUNCTIONAL

## 2022-04-20 DEVICE — GUIDEWIRE AMPLATZ EXTRA-STIFF CURVED .035" X 260CM: Type: IMPLANTABLE DEVICE | Status: FUNCTIONAL

## 2022-04-20 DEVICE — CATH MICRO CARAVEL 1.9FR135CM: Type: IMPLANTABLE DEVICE | Status: FUNCTIONAL

## 2022-04-20 RX ORDER — POTASSIUM CHLORIDE 20 MEQ
40 PACKET (EA) ORAL ONCE
Refills: 0 | Status: COMPLETED | OUTPATIENT
Start: 2022-04-20 | End: 2022-04-20

## 2022-04-20 RX ORDER — NIFEDIPINE 30 MG
90 TABLET, EXTENDED RELEASE 24 HR ORAL DAILY
Refills: 0 | Status: DISCONTINUED | OUTPATIENT
Start: 2022-04-20 | End: 2022-04-21

## 2022-04-20 RX ORDER — DEXTROSE 50 % IN WATER 50 %
12.5 SYRINGE (ML) INTRAVENOUS ONCE
Refills: 0 | Status: DISCONTINUED | OUTPATIENT
Start: 2022-04-20 | End: 2022-04-21

## 2022-04-20 RX ORDER — INSULIN LISPRO 100/ML
VIAL (ML) SUBCUTANEOUS
Refills: 0 | Status: DISCONTINUED | OUTPATIENT
Start: 2022-04-20 | End: 2022-04-21

## 2022-04-20 RX ORDER — CLOPIDOGREL BISULFATE 75 MG/1
600 TABLET, FILM COATED ORAL ONCE
Refills: 0 | Status: COMPLETED | OUTPATIENT
Start: 2022-04-20 | End: 2022-04-20

## 2022-04-20 RX ORDER — PANTOPRAZOLE SODIUM 20 MG/1
40 TABLET, DELAYED RELEASE ORAL DAILY
Refills: 0 | Status: DISCONTINUED | OUTPATIENT
Start: 2022-04-20 | End: 2022-04-20

## 2022-04-20 RX ORDER — SODIUM CHLORIDE 9 MG/ML
1000 INJECTION, SOLUTION INTRAVENOUS
Refills: 0 | Status: DISCONTINUED | OUTPATIENT
Start: 2022-04-20 | End: 2022-04-21

## 2022-04-20 RX ORDER — CLOPIDOGREL BISULFATE 75 MG/1
75 TABLET, FILM COATED ORAL DAILY
Refills: 0 | Status: DISCONTINUED | OUTPATIENT
Start: 2022-04-21 | End: 2022-04-21

## 2022-04-20 RX ORDER — MAGNESIUM OXIDE 400 MG ORAL TABLET 241.3 MG
400 TABLET ORAL ONCE
Refills: 0 | Status: COMPLETED | OUTPATIENT
Start: 2022-04-20 | End: 2022-04-20

## 2022-04-20 RX ORDER — ACETAMINOPHEN 500 MG
1000 TABLET ORAL ONCE
Refills: 0 | Status: COMPLETED | OUTPATIENT
Start: 2022-04-20 | End: 2022-04-20

## 2022-04-20 RX ORDER — ACETAMINOPHEN 500 MG
650 TABLET ORAL EVERY 6 HOURS
Refills: 0 | Status: DISCONTINUED | OUTPATIENT
Start: 2022-04-20 | End: 2022-04-21

## 2022-04-20 RX ORDER — DEXTROSE 50 % IN WATER 50 %
25 SYRINGE (ML) INTRAVENOUS ONCE
Refills: 0 | Status: DISCONTINUED | OUTPATIENT
Start: 2022-04-20 | End: 2022-04-21

## 2022-04-20 RX ORDER — NICARDIPINE HYDROCHLORIDE 30 MG/1
5 CAPSULE, EXTENDED RELEASE ORAL
Qty: 40 | Refills: 0 | Status: DISCONTINUED | OUTPATIENT
Start: 2022-04-20 | End: 2022-04-21

## 2022-04-20 RX ORDER — CEFAZOLIN SODIUM 1 G
2000 VIAL (EA) INJECTION EVERY 8 HOURS
Refills: 0 | Status: DISCONTINUED | OUTPATIENT
Start: 2022-04-20 | End: 2022-04-21

## 2022-04-20 RX ORDER — HEPARIN SODIUM 5000 [USP'U]/ML
5000 INJECTION INTRAVENOUS; SUBCUTANEOUS EVERY 8 HOURS
Refills: 0 | Status: DISCONTINUED | OUTPATIENT
Start: 2022-04-20 | End: 2022-04-21

## 2022-04-20 RX ORDER — DEXTROSE 50 % IN WATER 50 %
15 SYRINGE (ML) INTRAVENOUS ONCE
Refills: 0 | Status: DISCONTINUED | OUTPATIENT
Start: 2022-04-20 | End: 2022-04-21

## 2022-04-20 RX ORDER — FENTANYL CITRATE 50 UG/ML
12.5 INJECTION INTRAVENOUS ONCE
Refills: 0 | Status: DISCONTINUED | OUTPATIENT
Start: 2022-04-20 | End: 2022-04-20

## 2022-04-20 RX ORDER — ASPIRIN/CALCIUM CARB/MAGNESIUM 324 MG
81 TABLET ORAL DAILY
Refills: 0 | Status: DISCONTINUED | OUTPATIENT
Start: 2022-04-21 | End: 2022-04-21

## 2022-04-20 RX ORDER — SODIUM CHLORIDE 9 MG/ML
1000 INJECTION INTRAMUSCULAR; INTRAVENOUS; SUBCUTANEOUS
Refills: 0 | Status: DISCONTINUED | OUTPATIENT
Start: 2022-04-20 | End: 2022-04-21

## 2022-04-20 RX ORDER — GLUCAGON INJECTION, SOLUTION 0.5 MG/.1ML
1 INJECTION, SOLUTION SUBCUTANEOUS ONCE
Refills: 0 | Status: DISCONTINUED | OUTPATIENT
Start: 2022-04-20 | End: 2022-04-21

## 2022-04-20 RX ORDER — PANTOPRAZOLE SODIUM 20 MG/1
40 TABLET, DELAYED RELEASE ORAL
Refills: 0 | Status: DISCONTINUED | OUTPATIENT
Start: 2022-04-20 | End: 2022-04-21

## 2022-04-20 RX ADMIN — Medication 40 MILLIEQUIVALENT(S): at 21:30

## 2022-04-20 RX ADMIN — Medication 650 MILLIGRAM(S): at 21:26

## 2022-04-20 RX ADMIN — FENTANYL CITRATE 12.5 MICROGRAM(S): 50 INJECTION INTRAVENOUS at 19:10

## 2022-04-20 RX ADMIN — NICARDIPINE HYDROCHLORIDE 25 MG/HR: 30 CAPSULE, EXTENDED RELEASE ORAL at 19:14

## 2022-04-20 RX ADMIN — MAGNESIUM OXIDE 400 MG ORAL TABLET 400 MILLIGRAM(S): 241.3 TABLET ORAL at 21:30

## 2022-04-20 RX ADMIN — CLOPIDOGREL BISULFATE 600 MILLIGRAM(S): 75 TABLET, FILM COATED ORAL at 09:04

## 2022-04-20 RX ADMIN — Medication 650 MILLIGRAM(S): at 22:15

## 2022-04-20 RX ADMIN — Medication 100 MILLIGRAM(S): at 21:18

## 2022-04-20 RX ADMIN — FENTANYL CITRATE 12.5 MICROGRAM(S): 50 INJECTION INTRAVENOUS at 19:32

## 2022-04-20 RX ADMIN — NICARDIPINE HYDROCHLORIDE 25 MG/HR: 30 CAPSULE, EXTENDED RELEASE ORAL at 16:19

## 2022-04-20 RX ADMIN — Medication 400 MILLIGRAM(S): at 15:54

## 2022-04-20 RX ADMIN — Medication 1000 MILLIGRAM(S): at 16:16

## 2022-04-20 NOTE — H&P ADULT - NSICDXPASTMEDICALHX_GEN_ALL_CORE_FT
PAST MEDICAL HISTORY:  Aortic stenosis, severe     Arthritis     Diastolic CHF     Glaucoma     HTN (hypertension)     Nephrolithiasis

## 2022-04-20 NOTE — PROGRESS NOTE ADULT - ASSESSMENT
81 year old Syriac speaking female with newly diagnosed severe AS, presented to her cardiologist, Dr. Go Curiel,with complaints of worsening ROE, fatigue, dizziness, near syncope, and reduced exercise capacity. She can walk 1/2 block before needing to stop and rest. She also admits to occasional chest tightness, described as pressure. An echocardiogram preformed on 3/7/22 revealed severe AS with peak velocity 4m.s and ROSAURA 0.8 cm 2. Patient was referred to [facility], Dr. Mcdermott, for further evaluation of her AS.     ==== Neurovascular ====  -No delirium, pain well managed on current regimen  -C/w PRNs for Pain control  -Monitor neuro status    ==== Respiratory ====  -Saturates well on RA     -AM CXR stable, repeat in AM  -Encourage IS 10x/hour while awake, Cough and deep breathing exercises  -Monitor respiratory status via SpO2    ==== Cardiovascular ====  Monitor on Tele  Continue aspirin 81mg QD  Continue Statin  Continue Plavix 75mg QD    ==== GI ====   -Tolerating PO  -Prophylaxis: Protonix  -C/w bowel regimen    ==== /Renal ====  -BUN/Cr:   -Trend Cr on AM labs  -Replete electrolytes as needed    ==== ID ====   Afebrile, asymptomatic  -WBC:   -Continue to monitor for SIRS/Sepsis syndrome while inpatient    ==== Endocrine ====   -A1C: , no h/o DM  -TSH: , no h/o thyroid disease     ==== Hematologic ====   -H/H:   -CBC, chem in AM  -DVT ppx: HSQ 5000u q8h and SCDs    ==== Disposition Planning ====   Mini-ICU 81 year old Nepali speaking female with newly diagnosed severe AS, presented to her cardiologist, Dr. Go Curiel,with complaints of worsening ROE, fatigue, dizziness, near syncope, and reduced exercise capacity. She can walk 1/2 block before needing to stop and rest. She also admits to occasional chest tightness, described as pressure. An echocardiogram preformed on 3/7/22 revealed severe AS with peak velocity 4m.s and ROSAURA 0.8 cm 2. Patient was referred to [facility], Dr. Mcdermott, for further evaluation of her AS.  On 4/20/20 patient underwent TF TAVR, no rhythm issues intraop.  Patient brought to [facility] for postop recovery.    ==== Neurovascular ====  -No delirium, pain well managed on current regimen  -C/w PRNs for Pain control  -Monitor neuro status    ==== Respiratory ====  -Saturates well on RA     -AM CXR stable, repeat in AM  -Encourage IS 10x/hour while awake, Cough and deep breathing exercises  -Monitor respiratory status via SpO2    ==== Cardiovascular ====  Monitor on Tele  Continue aspirin 81mg QD  Continue Statin  Continue Plavix 75mg QD    ==== GI ====   -Tolerating PO  -Prophylaxis: Protonix  -C/w bowel regimen    ==== /Renal ====  -BUN/Cr: 12/0.68  -Trend Cr on AM labs  -Replete electrolytes as needed    ==== ID ====   Afebrile, asymptomatic  -WBC: 11  -Continue to monitor for SIRS/Sepsis syndrome while inpatient    ==== Endocrine ====   -A1C: , no h/o DM  -TSH: , no h/o thyroid disease     ==== Hematologic ====   -H/H: 9.6/26  -CBC, chem in AM  -DVT ppx: HSQ 5000u q8h and SCDs    ==== Disposition Planning ====   Mini-ICU

## 2022-04-20 NOTE — BRIEF OPERATIVE NOTE - NSICDXBRIEFPOSTOP_GEN_ALL_CORE_FT
POST-OP DIAGNOSIS:  CAD (coronary artery disease) 20-Apr-2022 15:11:56  Meaghan Reyna  Aortic valve stenosis 20-Apr-2022 15:12:05  Meaghan Reyna

## 2022-04-20 NOTE — BRIEF OPERATIVE NOTE - NSICDXBRIEFPROCEDURE_GEN_ALL_CORE_FT
PROCEDURES:  TAVR, percutaneous 20-Apr-2022 15:10:20 23 mm Medtronic Core Valve Meaghan Reyna  PCI, with stent insertion 20-Apr-2022 15:10:53 to Proximal RCA w/ 3.0 m x 12 mm [place] Meaghan Reyna

## 2022-04-20 NOTE — PROVIDER CONTACT NOTE (OTHER) - BACKGROUND
as per daughter patient has not really  walked around . Today she ambulated to the hybrid area from the first floor

## 2022-04-20 NOTE — H&P ADULT - HISTORY OF PRESENT ILLNESS
81 year old Greek speaking female with newly diagnosed severe AS, presented to her cardiologist, Dr. Go Curiel,with complaints of worsening ROE, fatigue, dizziness, near syncope, and reduced exercise capacity. She can walk 1/2 block before needing to stop and rest. She also admits to occasional chest tightness, described as pressure. An echocardiogram preformed on 3/7/22 revealed severe AS with peak velocity 4m.s and ROSAURA 0.8 cm 2. Patient was referred to [facility], Dr. Mcdermott, for further evaluation of her AS.     Patient reports of intermittent SOB, worse with ambulation, needs to rest after 1/2 block. Reports being very active pre-pandemic, but now has not been very active at home. Also admits to LE edema bilaterally, however reports this is chronic. She states she used to take a diuretic, buts stopped because she felt it did not help with the edema. Patient otherwise denies SOB at rest, worsening LE edema, dizziness, or pre-syncope. 81 year old Yakut speaking female with newly diagnosed severe AS, presented to her cardiologist, Dr. Go Curiel,with complaints of worsening ROE, fatigue, dizziness, near syncope, and reduced exercise capacity. She can walk 1/2 block before needing to stop and rest. She also admits to occasional chest tightness, described as pressure. An echocardiogram preformed on 3/7/22 revealed severe AS with peak velocity 4m.s and ROSAURA 0.8 cm 2. Patient was referred to [facility], Dr. Mcdermott, for further evaluation of her AS. Work up revealed CAD, significant proximal lesion of RCA. Plan was made for TAVR and PCI.    Patient reports of intermittent SOB, worse with ambulation, needs to rest after 1/2 block. Reports being very active pre-pandemic, but now has not been very active at home. Also admits to LE edema bilaterally, however reports this is chronic. She states she used to take a diuretic, buts stopped because she felt it did not help with the edema. Patient otherwise denies SOB at rest, worsening LE edema, dizziness, or pre-syncope.

## 2022-04-20 NOTE — H&P ADULT - NSHPREVIEWOFSYSTEMS_GEN_ALL_CORE
CONSTITUTIONAL:  Denies Fevers / chills, sweats, fatigue                                      NEURO:  Denies parathesias, seizures, syncope, confusion                                                                                EYES:  Denies Blurry vision, discharge, pain, loss of vision                                                                                    ENMT:  Denies Difficulty hearing, vertigo, dysphagia, epistaxis                            CV:  per HPI                                                                                          RESPIRATORY:  + ROE, Denies Wheezing, cough / sputum, hemoptysis                                                                GI:  Denies Nausea, vomiting, diarrhea, constipation, melena                                         : Denies Hematuria, dysuria, urgency, incontinence                                                                                         MUSCULOSKELETAL:  Denies arthritis, joint swelling, muscle weakness                                                             SKIN/BREAST:  Denies rash, itching

## 2022-04-20 NOTE — BRIEF OPERATIVE NOTE - OPERATION/FINDINGS
Access    Right radial artery: 6 Fr sheath (pulled with radial band on 16 mm)  Left radial artery 6 Fr sheath in place    Right femoral artery: 6 Fr sheath (angioseal)  Right femoral vein: 6 Fr sheath (manually pulled)  Left femoral artery 14 Fr sheath: (perclosed and angioseal)

## 2022-04-20 NOTE — PROGRESS NOTE ADULT - SUBJECTIVE AND OBJECTIVE BOX
Patient discussed on morning rounds with Dr. Mcdermott    Operation / Date: 4/20/22 TF TAVR    SUBJECTIVE ASSESSMENT:  81y Female         Vital Signs Last 24 Hrs  T(C): 36 (20 Apr 2022 14:22), Max: 36 (20 Apr 2022 14:22)  T(F): 96.8 (20 Apr 2022 14:22), Max: 96.8 (20 Apr 2022 14:22)  HR: 67 (20 Apr 2022 14:45) (64 - 67)  BP: 183/77 (20 Apr 2022 14:45) (183/77 - 183/77)  BP(mean): 110 (20 Apr 2022 14:45) (110 - 110)  RR: 22 (20 Apr 2022 14:45) (15 - 22)  SpO2: 94% (20 Apr 2022 14:45) (94% - 100%)  I&O's Detail      CHEST TUBE:  Yes/No. AIR LEAKS: Yes/No. Suction / H2O SEAL.   KENNETH DRAIN:  Yes/No.  EPICARDIAL WIRES: Yes/No.  TIE DOWNS: Yes/No.  UNDERWOOD: Yes/No.    PHYSICAL EXAM:    General:     Neurological:    Cardiovascular:    Respiratory:    Gastrointestinal:    Extremities:    Vascular:    Incision Sites:    LABS:                        9.1    11.21 )-----------( 288      ( 20 Apr 2022 14:39 )             26.9       COUMADIN:  Yes/No. REASON: .    PT/INR - ( 20 Apr 2022 08:09 )   PT: 12.5 sec;   INR: 1.05          PTT - ( 20 Apr 2022 08:09 )  PTT:28.7 sec    04-20    133<L>  |  95<L>  |  15  ----------------------------<  146<H>  4.5   |  25  |  0.82    Ca    9.4      20 Apr 2022 08:08    TPro  8.6<H>  /  Alb  4.0  /  TBili  0.3  /  DBili  x   /  AST  33  /  ALT  26  /  AlkPhos  108  04-20          MEDICATIONS  (STANDING):  ceFAZolin   IVPB 2000 milliGRAM(s) IV Intermittent every 8 hours  dextrose 5%. 1000 milliLiter(s) (100 mL/Hr) IV Continuous <Continuous>  dextrose 5%. 1000 milliLiter(s) (50 mL/Hr) IV Continuous <Continuous>  dextrose 50% Injectable 25 Gram(s) IV Push once  dextrose 50% Injectable 12.5 Gram(s) IV Push once  dextrose 50% Injectable 25 Gram(s) IV Push once  glucagon  Injectable 1 milliGRAM(s) IntraMuscular once  heparin   Injectable 5000 Unit(s) SubCutaneous every 8 hours  insulin lispro (ADMELOG) corrective regimen sliding scale   SubCutaneous three times a day before meals  pantoprazole  Injectable 40 milliGRAM(s) IV Push daily  sodium chloride 0.9%. 1000 milliLiter(s) (10 mL/Hr) IV Continuous <Continuous>    MEDICATIONS  (PRN):  dextrose Oral Gel 15 Gram(s) Oral once PRN Blood Glucose LESS THAN 70 milliGRAM(s)/deciliter        RADIOLOGY & ADDITIONAL TESTS:     Patient discussed on morning rounds with Dr. Mcdermott    Operation / Date: 4/20/22 TF TAVR    SUBJECTIVE ASSESSMENT:  81y Female seen and examined at bedside.  Patient complaining of back pain.  Denies chest pain, shortness of breath, nausea, vomiting.        Vital Signs Last 24 Hrs  T(C): 36 (20 Apr 2022 14:22), Max: 36 (20 Apr 2022 14:22)  T(F): 96.8 (20 Apr 2022 14:22), Max: 96.8 (20 Apr 2022 14:22)  HR: 67 (20 Apr 2022 14:45) (64 - 67)  BP: 183/77 (20 Apr 2022 14:45) (183/77 - 183/77)  BP(mean): 110 (20 Apr 2022 14:45) (110 - 110)  RR: 22 (20 Apr 2022 14:45) (15 - 22)  SpO2: 94% (20 Apr 2022 14:45) (94% - 100%)  I&O's Detail      CHEST TUBE:  No.   KENNETH DRAIN:  No.  EPICARDIAL WIRES: No.  TIE DOWNS: No.  UNDERWOOD: No.    PHYSICAL EXAM:    GEN: NAD, looks comfortable  Psych: Mood appropriate  Neuro: A&Ox3.  No focal deficits.  Moving all extremities.   HEENT: No obvious abnormalities  CV: S1S2, regular, no murmurs appreciated.  No carotid bruits.  No JVD  Lungs: Clear B/L.  No wheezing, rales or rhonchi  ABD: Soft, non-tender, non-distended.  +Bowel sounds  EXT: Warm and well perfused.  No peripheral edema noted  Musculoskeletal: Moving all extremities with normal ROM, no joint swelling  PV: Pedal pulses palpable  Incision: bilateral groins soft, no hematoma, right radial with TR band, left radial with sheath.    LABS:                        9.1    11.21 )-----------( 288      ( 20 Apr 2022 14:39 )             26.9       COUMADIN:  No. REASON: .    PT/INR - ( 20 Apr 2022 08:09 )   PT: 12.5 sec;   INR: 1.05          PTT - ( 20 Apr 2022 08:09 )  PTT:28.7 sec    04-20    133<L>  |  95<L>  |  15  ----------------------------<  146<H>  4.5   |  25  |  0.82    Ca    9.4      20 Apr 2022 08:08    TPro  8.6<H>  /  Alb  4.0  /  TBili  0.3  /  DBili  x   /  AST  33  /  ALT  26  /  AlkPhos  108  04-20          MEDICATIONS  (STANDING):  ceFAZolin   IVPB 2000 milliGRAM(s) IV Intermittent every 8 hours  dextrose 5%. 1000 milliLiter(s) (100 mL/Hr) IV Continuous <Continuous>  dextrose 5%. 1000 milliLiter(s) (50 mL/Hr) IV Continuous <Continuous>  dextrose 50% Injectable 25 Gram(s) IV Push once  dextrose 50% Injectable 12.5 Gram(s) IV Push once  dextrose 50% Injectable 25 Gram(s) IV Push once  glucagon  Injectable 1 milliGRAM(s) IntraMuscular once  heparin   Injectable 5000 Unit(s) SubCutaneous every 8 hours  insulin lispro (ADMELOG) corrective regimen sliding scale   SubCutaneous three times a day before meals  pantoprazole  Injectable 40 milliGRAM(s) IV Push daily  sodium chloride 0.9%. 1000 milliLiter(s) (10 mL/Hr) IV Continuous <Continuous>    MEDICATIONS  (PRN):  dextrose Oral Gel 15 Gram(s) Oral once PRN Blood Glucose LESS THAN 70 milliGRAM(s)/deciliter        RADIOLOGY & ADDITIONAL TESTS:

## 2022-04-20 NOTE — H&P ADULT - NSHPPHYSICALEXAM_GEN_ALL_CORE
VS: Temp: 97.4, HR 72bpm, /63, SaO2 96% RA    Constitutional: NAD    Eyes: EOMI, PERRL    ENMT: hearing grossly intact, oropharynx benign    Neck: supple, no JVD    Respiratory: CTABL    Cardiovascular: RRR, III/VI CHELO    Gastrointestinal: obese, + BS, soft, non tender    Extremities: warm, trace edema    Vascular: DP 1+ bilaterally. Femorals, radials, Carotids 2 + bilaterally    Neurological: A&O x 3, non focal    Skin: no lesions or rashes    Musculoskeletal: JOY

## 2022-04-20 NOTE — H&P ADULT - NSICDXPASTSURGICALHX_GEN_ALL_CORE_FT
PAST SURGICAL HISTORY:  Fixation hardware in leg History of leg surgery with metal placed -right    History of

## 2022-04-20 NOTE — H&P ADULT - ASSESSMENT
81 year old Nepali speaking female with newly diagnosed severe AS, presented to her cardiologist, Dr. Go Curiel,with complaints of worsening ROE, fatigue, dizziness, near syncope, and reduced exercise capacity. She can walk 1/2 block before needing to stop and rest. She also admits to occasional chest tightness, described as pressure. An echocardiogram preformed on 3/7/22 revealed severe AS with peak velocity 4m.s and ROSAURA 0.8 cm 2. Patient was referred to [facility], Dr. Mcdermott, for further evaluation of her AS. Work up revealed CAD, significant proximal lesion of RCA. Plan was made for TAVR and PCI.    Aortic Stenosis  -Plan for TAVR w/ Core Valve  -General anesthesia, and TTE guided    CAD  -Plan for PCI/ stent to RCA  -Plavix 600mg load today and home ASA. Continue daily ASA/Plavix/Statin.     Hypertension  -Monitor VS  -Resume home antihypertensives as needed    Dispo  -Mini ICU s/p TAVR/PCI

## 2022-04-20 NOTE — BRIEF OPERATIVE NOTE - NSICDXBRIEFPREOP_GEN_ALL_CORE_FT
PRE-OP DIAGNOSIS:  CAD (coronary artery disease) 20-Apr-2022 15:11:17  Meaghan Reyna  Aortic valve stenosis 20-Apr-2022 15:11:48  Meaghan Reyna

## 2022-04-21 ENCOUNTER — TRANSCRIPTION ENCOUNTER (OUTPATIENT)
Age: 82
End: 2022-04-21

## 2022-04-21 VITALS — TEMPERATURE: 97 F

## 2022-04-21 LAB
ALBUMIN SERPL ELPH-MCNC: 2.9 G/DL — LOW (ref 3.3–5)
ALP SERPL-CCNC: 93 U/L — SIGNIFICANT CHANGE UP (ref 40–120)
ALT FLD-CCNC: 16 U/L — SIGNIFICANT CHANGE UP (ref 10–45)
ANION GAP SERPL CALC-SCNC: 12 MMOL/L — SIGNIFICANT CHANGE UP (ref 5–17)
APTT BLD: 28.6 SEC — SIGNIFICANT CHANGE UP (ref 27.5–35.5)
AST SERPL-CCNC: 24 U/L — SIGNIFICANT CHANGE UP (ref 10–40)
BILIRUB SERPL-MCNC: 0.2 MG/DL — SIGNIFICANT CHANGE UP (ref 0.2–1.2)
BUN SERPL-MCNC: 9 MG/DL — SIGNIFICANT CHANGE UP (ref 7–23)
CALCIUM SERPL-MCNC: 8.2 MG/DL — LOW (ref 8.4–10.5)
CHLORIDE SERPL-SCNC: 101 MMOL/L — SIGNIFICANT CHANGE UP (ref 96–108)
CO2 SERPL-SCNC: 22 MMOL/L — SIGNIFICANT CHANGE UP (ref 22–31)
CREAT SERPL-MCNC: 0.76 MG/DL — SIGNIFICANT CHANGE UP (ref 0.5–1.3)
EGFR: 78 ML/MIN/1.73M2 — SIGNIFICANT CHANGE UP
GLUCOSE BLDC GLUCOMTR-MCNC: 218 MG/DL — HIGH (ref 70–99)
GLUCOSE BLDC GLUCOMTR-MCNC: 251 MG/DL — HIGH (ref 70–99)
GLUCOSE SERPL-MCNC: 101 MG/DL — HIGH (ref 70–99)
HCT VFR BLD CALC: 25.6 % — LOW (ref 34.5–45)
HGB BLD-MCNC: 8.4 G/DL — LOW (ref 11.5–15.5)
INR BLD: 1.24 — HIGH (ref 0.88–1.16)
ISTAT ARTERIAL BE: 0 MMOL/L — SIGNIFICANT CHANGE UP (ref -2–3)
ISTAT ARTERIAL GLUCOSE: 126 MG/DL — HIGH (ref 70–99)
ISTAT ARTERIAL HCO3: 25 MMOL/L — SIGNIFICANT CHANGE UP (ref 22–26)
ISTAT ARTERIAL HEMATOCRIT: 28 % — LOW (ref 34.5–45)
ISTAT ARTERIAL HEMOGLOBIN: 9.5 G/DL — LOW (ref 11.5–15.5)
ISTAT ARTERIAL IONIZED CALCIUM: 1.16 MMOL/L — SIGNIFICANT CHANGE UP (ref 1.12–1.3)
ISTAT ARTERIAL PCO2: 41 MMHG — SIGNIFICANT CHANGE UP (ref 35–45)
ISTAT ARTERIAL PH: 7.39 — SIGNIFICANT CHANGE UP (ref 7.35–7.45)
ISTAT ARTERIAL PO2: <66 MMHG — LOW (ref 80–105)
ISTAT ARTERIAL POTASSIUM: 3.6 MMOL/L — SIGNIFICANT CHANGE UP (ref 3.5–5.3)
ISTAT ARTERIAL SO2: 91 % — LOW (ref 95–98)
ISTAT ARTERIAL SODIUM: 131 MMOL/L — LOW (ref 135–145)
ISTAT ARTERIAL TCO2: 26 MMOL/L — SIGNIFICANT CHANGE UP (ref 22–31)
MAGNESIUM SERPL-MCNC: 1.9 MG/DL — SIGNIFICANT CHANGE UP (ref 1.6–2.6)
MCHC RBC-ENTMCNC: 29.6 PG — SIGNIFICANT CHANGE UP (ref 27–34)
MCHC RBC-ENTMCNC: 32.8 GM/DL — SIGNIFICANT CHANGE UP (ref 32–36)
MCV RBC AUTO: 90.1 FL — SIGNIFICANT CHANGE UP (ref 80–100)
NRBC # BLD: 0 /100 WBCS — SIGNIFICANT CHANGE UP (ref 0–0)
PHOSPHATE SERPL-MCNC: 4.1 MG/DL — SIGNIFICANT CHANGE UP (ref 2.5–4.5)
PLATELET # BLD AUTO: 294 K/UL — SIGNIFICANT CHANGE UP (ref 150–400)
POTASSIUM SERPL-MCNC: 4.3 MMOL/L — SIGNIFICANT CHANGE UP (ref 3.5–5.3)
POTASSIUM SERPL-SCNC: 4.3 MMOL/L — SIGNIFICANT CHANGE UP (ref 3.5–5.3)
PROT SERPL-MCNC: 6.9 G/DL — SIGNIFICANT CHANGE UP (ref 6–8.3)
PROTHROM AB SERPL-ACNC: 14.8 SEC — HIGH (ref 10.5–13.4)
RBC # BLD: 2.84 M/UL — LOW (ref 3.8–5.2)
RBC # FLD: 13 % — SIGNIFICANT CHANGE UP (ref 10.3–14.5)
SODIUM SERPL-SCNC: 135 MMOL/L — SIGNIFICANT CHANGE UP (ref 135–145)
WBC # BLD: 12.17 K/UL — HIGH (ref 3.8–10.5)
WBC # FLD AUTO: 12.17 K/UL — HIGH (ref 3.8–10.5)

## 2022-04-21 PROCEDURE — 86850 RBC ANTIBODY SCREEN: CPT

## 2022-04-21 PROCEDURE — 85730 THROMBOPLASTIN TIME PARTIAL: CPT

## 2022-04-21 PROCEDURE — 85610 PROTHROMBIN TIME: CPT

## 2022-04-21 PROCEDURE — 84295 ASSAY OF SERUM SODIUM: CPT

## 2022-04-21 PROCEDURE — 93005 ELECTROCARDIOGRAM TRACING: CPT

## 2022-04-21 PROCEDURE — 83735 ASSAY OF MAGNESIUM: CPT

## 2022-04-21 PROCEDURE — 93306 TTE W/DOPPLER COMPLETE: CPT

## 2022-04-21 PROCEDURE — 80053 COMPREHEN METABOLIC PANEL: CPT

## 2022-04-21 PROCEDURE — 82803 BLOOD GASES ANY COMBINATION: CPT

## 2022-04-21 PROCEDURE — 85014 HEMATOCRIT: CPT

## 2022-04-21 PROCEDURE — 86923 COMPATIBILITY TEST ELECTRIC: CPT

## 2022-04-21 PROCEDURE — L8699: CPT

## 2022-04-21 PROCEDURE — C1894: CPT

## 2022-04-21 PROCEDURE — 93306 TTE W/DOPPLER COMPLETE: CPT | Mod: 26

## 2022-04-21 PROCEDURE — C9399: CPT

## 2022-04-21 PROCEDURE — 83880 ASSAY OF NATRIURETIC PEPTIDE: CPT

## 2022-04-21 PROCEDURE — C1769: CPT

## 2022-04-21 PROCEDURE — C1725: CPT

## 2022-04-21 PROCEDURE — 97161 PT EVAL LOW COMPLEX 20 MIN: CPT

## 2022-04-21 PROCEDURE — C1760: CPT

## 2022-04-21 PROCEDURE — 86901 BLOOD TYPING SEROLOGIC RH(D): CPT

## 2022-04-21 PROCEDURE — 36415 COLL VENOUS BLD VENIPUNCTURE: CPT

## 2022-04-21 PROCEDURE — 82962 GLUCOSE BLOOD TEST: CPT

## 2022-04-21 PROCEDURE — 93321 DOPPLER ECHO F-UP/LMTD STD: CPT

## 2022-04-21 PROCEDURE — 80048 BASIC METABOLIC PNL TOTAL CA: CPT

## 2022-04-21 PROCEDURE — C1889: CPT

## 2022-04-21 PROCEDURE — 82330 ASSAY OF CALCIUM: CPT

## 2022-04-21 PROCEDURE — 71045 X-RAY EXAM CHEST 1 VIEW: CPT | Mod: 26

## 2022-04-21 PROCEDURE — 99232 SBSQ HOSP IP/OBS MODERATE 35: CPT

## 2022-04-21 PROCEDURE — 82947 ASSAY GLUCOSE BLOOD QUANT: CPT

## 2022-04-21 PROCEDURE — 84132 ASSAY OF SERUM POTASSIUM: CPT

## 2022-04-21 PROCEDURE — 84443 ASSAY THYROID STIM HORMONE: CPT

## 2022-04-21 PROCEDURE — C1761: CPT

## 2022-04-21 PROCEDURE — 85027 COMPLETE CBC AUTOMATED: CPT

## 2022-04-21 PROCEDURE — 85025 COMPLETE CBC W/AUTO DIFF WBC: CPT

## 2022-04-21 PROCEDURE — C1887: CPT

## 2022-04-21 PROCEDURE — 86900 BLOOD TYPING SEROLOGIC ABO: CPT

## 2022-04-21 PROCEDURE — 84100 ASSAY OF PHOSPHORUS: CPT

## 2022-04-21 PROCEDURE — C1874: CPT

## 2022-04-21 PROCEDURE — 71045 X-RAY EXAM CHEST 1 VIEW: CPT

## 2022-04-21 PROCEDURE — 93010 ELECTROCARDIOGRAM REPORT: CPT

## 2022-04-21 RX ORDER — NIFEDIPINE 30 MG
1 TABLET, EXTENDED RELEASE 24 HR ORAL
Qty: 30 | Refills: 0
Start: 2022-04-21 | End: 2022-05-20

## 2022-04-21 RX ORDER — CARVEDILOL PHOSPHATE 80 MG/1
1 CAPSULE, EXTENDED RELEASE ORAL
Qty: 60 | Refills: 0
Start: 2022-04-21 | End: 2022-05-20

## 2022-04-21 RX ORDER — PANTOPRAZOLE SODIUM 20 MG/1
1 TABLET, DELAYED RELEASE ORAL
Qty: 30 | Refills: 0
Start: 2022-04-21 | End: 2022-05-20

## 2022-04-21 RX ORDER — CARVEDILOL PHOSPHATE 80 MG/1
1 CAPSULE, EXTENDED RELEASE ORAL
Qty: 0 | Refills: 0 | DISCHARGE

## 2022-04-21 RX ORDER — ACETAMINOPHEN 500 MG
2 TABLET ORAL
Qty: 240 | Refills: 0
Start: 2022-04-21 | End: 2022-05-20

## 2022-04-21 RX ORDER — CARVEDILOL PHOSPHATE 80 MG/1
3.12 CAPSULE, EXTENDED RELEASE ORAL EVERY 12 HOURS
Refills: 0 | Status: DISCONTINUED | OUTPATIENT
Start: 2022-04-21 | End: 2022-04-21

## 2022-04-21 RX ORDER — MAGNESIUM OXIDE 400 MG ORAL TABLET 241.3 MG
400 TABLET ORAL ONCE
Refills: 0 | Status: COMPLETED | OUTPATIENT
Start: 2022-04-21 | End: 2022-04-21

## 2022-04-21 RX ORDER — MELOXICAM 15 MG/1
0 TABLET ORAL
Qty: 0 | Refills: 0 | DISCHARGE

## 2022-04-21 RX ORDER — ASPIRIN/CALCIUM CARB/MAGNESIUM 324 MG
1 TABLET ORAL
Qty: 30 | Refills: 0
Start: 2022-04-21 | End: 2022-05-20

## 2022-04-21 RX ORDER — CLOPIDOGREL BISULFATE 75 MG/1
1 TABLET, FILM COATED ORAL
Qty: 30 | Refills: 0
Start: 2022-04-21 | End: 2022-05-20

## 2022-04-21 RX ORDER — ASPIRIN/CALCIUM CARB/MAGNESIUM 324 MG
0 TABLET ORAL
Qty: 0 | Refills: 0 | DISCHARGE

## 2022-04-21 RX ORDER — NIFEDIPINE 30 MG
1 TABLET, EXTENDED RELEASE 24 HR ORAL
Qty: 0 | Refills: 0 | DISCHARGE

## 2022-04-21 RX ORDER — SODIUM CHLORIDE 9 MG/ML
3 INJECTION INTRAMUSCULAR; INTRAVENOUS; SUBCUTANEOUS EVERY 8 HOURS
Refills: 0 | Status: DISCONTINUED | OUTPATIENT
Start: 2022-04-21 | End: 2022-04-21

## 2022-04-21 RX ADMIN — MAGNESIUM OXIDE 400 MG ORAL TABLET 400 MILLIGRAM(S): 241.3 TABLET ORAL at 06:37

## 2022-04-21 RX ADMIN — HEPARIN SODIUM 5000 UNIT(S): 5000 INJECTION INTRAVENOUS; SUBCUTANEOUS at 06:37

## 2022-04-21 RX ADMIN — Medication 100 MILLIGRAM(S): at 06:37

## 2022-04-21 RX ADMIN — PANTOPRAZOLE SODIUM 40 MILLIGRAM(S): 20 TABLET, DELAYED RELEASE ORAL at 06:37

## 2022-04-21 RX ADMIN — Medication 81 MILLIGRAM(S): at 11:28

## 2022-04-21 RX ADMIN — Medication 4: at 13:32

## 2022-04-21 RX ADMIN — NICARDIPINE HYDROCHLORIDE 25 MG/HR: 30 CAPSULE, EXTENDED RELEASE ORAL at 02:58

## 2022-04-21 RX ADMIN — CLOPIDOGREL BISULFATE 75 MILLIGRAM(S): 75 TABLET, FILM COATED ORAL at 11:28

## 2022-04-21 RX ADMIN — Medication 90 MILLIGRAM(S): at 06:37

## 2022-04-21 RX ADMIN — Medication 650 MILLIGRAM(S): at 11:28

## 2022-04-21 NOTE — PHYSICAL THERAPY INITIAL EVALUATION ADULT - PERTINENT HX OF CURRENT PROBLEM, REHAB EVAL
81 year old Albanian speaking female with newly diagnosed severe AS, presented to her cardiologist, Dr. Go Curiel,with complaints of worsening ROE, fatigue, dizziness, near syncope, and reduced exercise capacity. She can walk 1/2 block before needing to stop and rest. She also admits to occasional chest tightness, described as pressure. An echocardiogram preformed on 3/7/22 revealed severe AS 81 year old Armenian speaking female with newly diagnosed severe AS, presented to her cardiologist, Dr. Go Curiel,with complaints of worsening ROE, fatigue, dizziness, near syncope, and reduced exercise capacity. She can walk 1/2 block before needing to stop and rest. She also admits to occasional chest tightness, described as pressure. An echocardiogram preformed on 3/7/22 revealed severe AS. Also diagnosed with CAD

## 2022-04-21 NOTE — DISCHARGE NOTE PROVIDER - CARE PROVIDER_API CALL
Mateo Mcdermott (MD)  Cardiology; Interventional Cardiology  [address] [place] [address]  Phone: [phone]  Fax: [phone]  Scheduled Appointment: 05/02/2022 11:15 AM    Go Curiel)  Cardiovascular Medicine  [address] [place] [address]  Phone: [phone]  Fax: [phone]  Follow Up Time: 2 weeks

## 2022-04-21 NOTE — DISCHARGE NOTE PROVIDER - PROVIDER TOKENS
PROVIDER:[TOKEN:[[id number]],SCHEDULEDAPPT:[05/02/2022],SCHEDULEDAPPTTIME:[11:15 AM]],PROVIDER:[TOKEN:[[id number]],FOLLOWUP:[2 weeks]]

## 2022-04-21 NOTE — DISCHARGE NOTE NURSING/CASE MANAGEMENT/SOCIAL WORK - NSDCPEFALRISK_GEN_ALL_CORE
For information on Fall & Injury Prevention, visit: [email or web address] OR  [email or web address] OR  https://www.cdc.gov/steadi/patient.html

## 2022-04-21 NOTE — DISCHARGE NOTE PROVIDER - NSDCCPCAREPLAN_GEN_ALL_CORE_FT
PRINCIPAL DISCHARGE DIAGNOSIS  Diagnosis: Severe aortic stenosis  Assessment and Plan of Treatment:       SECONDARY DISCHARGE DIAGNOSES  Diagnosis: Nephrolithiasis  Assessment and Plan of Treatment:     Diagnosis: Arthritis  Assessment and Plan of Treatment:     Diagnosis: Glaucoma  Assessment and Plan of Treatment:     Diagnosis: HTN (hypertension)  Assessment and Plan of Treatment:     Diagnosis: CAD (coronary artery disease)  Assessment and Plan of Treatment:

## 2022-04-21 NOTE — DISCHARGE NOTE PROVIDER - NSDCMRMEDTOKEN_GEN_ALL_CORE_FT
aspirin 81 mg oral tablet, chewable: orally 2 times a week  carvedilol 12.5 mg oral tablet: 1 tab(s) orally 2 times a day  meloxicam 15 mg oral tablet:   NIFEdipine 90 mg oral tablet, extended release: 1 tab(s) orally once a day   acetaminophen 325 mg oral tablet: 2 tab(s) orally every 6 hours, As needed, Mild Pain (1 - 3)  aspirin 81 mg oral delayed release tablet: 1 tab(s) orally once a day  carvedilol 3.125 mg oral tablet: 1 tab(s) orally every 12 hours  clopidogrel 75 mg oral tablet: 1 tab(s) orally once a day  NIFEdipine 90 mg oral tablet, extended release: 1 tab(s) orally once a day  pantoprazole 40 mg oral delayed release tablet: 1 tab(s) orally once a day (before a meal)

## 2022-04-21 NOTE — DISCHARGE NOTE PROVIDER - NSDCCPTREATMENT_GEN_ALL_CORE_FT
PRINCIPAL PROCEDURE  Procedure: TAVR, percutaneous  Findings and Treatment: 23 mm Medtronic Core Valve      SECONDARY PROCEDURE  Procedure: PCI, with stent insertion  Findings and Treatment: to Proximal RCA w/ 3.0 m x 12 mm [place]

## 2022-04-21 NOTE — PHYSICAL THERAPY INITIAL EVALUATION ADULT - ADDITIONAL COMMENTS
Patient lives in a house with her cousins. her niece is her HHA m-f x 5 hours and sunday for 9 hours. Patient ambulated with cane PTA. Needed assist with ADLs lately. Has 5 REYNA

## 2022-04-21 NOTE — DISCHARGE NOTE NURSING/CASE MANAGEMENT/SOCIAL WORK - PATIENT PORTAL LINK FT
You can access the FollowMyHealth Patient Portal offered by [facility] by registering at the following website: [email or web address]. By joining [facility]’s FollowMyHealth portal, you will also be able to view your health information using other applications (apps) compatible with our system.

## 2022-04-21 NOTE — DISCHARGE NOTE PROVIDER - HOSPITAL COURSE
81 year old Estonian speaking female with newly diagnosed severe AS, presented to her cardiologist, Dr. Go Curiel,with complaints of worsening ROE, fatigue, dizziness, near syncope, and reduced exercise capacity. She can walk 1/2 block before needing to stop and rest. She also admits to occasional chest tightness, described as pressure. An echocardiogram preformed on 3/7/22 revealed severe AS with peak velocity 4m.s and ROSAURA 0.8 cm 2. Patient was referred to [facility], Dr. Mcdermott, for further evaluation of her AS.  On 4/20/20 patient underwent TF TAVR, no rhythm issues intraop.  Patient brought to [facility] for postop recovery.  Patient had rodriguez placed for retention.  POD 1 rodriguez removed, echo performed.... Nifedipine and coreg restarted.  Patient ambulating independently with room air, tolerating diet, pain controlled, urinating and having bowel movements.  Per Dr. Mcdermott, Patient stable for discharge home.     Over 35 minutes was spent with the patient reviewing the discharge material including medications, follow up appointments, recovery, concerning symptoms, and how to contact their health care providers if they have questions 81 year old Turkish speaking female with newly diagnosed severe AS, presented to her cardiologist, Dr. Go Curiel,with complaints of worsening ROE, fatigue, dizziness, near syncope, and reduced exercise capacity. She can walk 1/2 block before needing to stop and rest. She also admits to occasional chest tightness, described as pressure. An echocardiogram preformed on 3/7/22 revealed severe AS with peak velocity 4m.s and ROSAURA 0.8 cm 2. Patient was referred to [facility], Dr. Mcdermott, for further evaluation of her AS.  On 4/20/20 patient underwent TF TAVR, no rhythm issues intraop.  Patient brought to [facility] for postop recovery.  Patient had rodriguez placed for retention.  POD 1 rodriguez removed, echo performed see results below, Nifedipine and coreg restarted.  Passed TOV. Patient ambulating independently with room air, tolerating diet, pain controlled, urinating and having bowel movements.  Per Dr. Mcdermott, Patient stable for discharge home.     Over 35 minutes was spent with the patient reviewing the discharge material including medications, follow up appointments, recovery, concerning symptoms, and how to contact their health care providers if they have questions    < from: TTE Echo Complete w/o Contrast w/ Doppler (04.21.22 @ 09:45) >       1. Normal left ventricular size and systolic function.   2. Mild symmetric left ventricular hypertrophy.   3. Mildly dilated left atrium.   4. 23 mm Miguelina 3 Ultra valve is noted in the aortic position with trace   paravalvular aortic regurgitation. Peak transvalvular velocity is 3.33   m/s, the mean transvalvular gradient is 23.10 mmHg, and the LVOT/AV   velocity ratio is 0.46. Peak transaortic gradient is 44.36 mmHg.   5. Mild mitral regurgitation.   6. Pulmonary hypertension present, pulmonary artery systolic pressure is   45 mmHg.   7. No pericardial effusion.   8. Right pleural effusion.   9. Compared to the previous TTE performed on 4/20/2022, patient is s/p   TAVR.    < end of copied text >

## 2022-04-21 NOTE — PHYSICAL THERAPY INITIAL EVALUATION ADULT - GAIT DISTANCE, PT EVAL
75 feet x 1 pushing portable monitor, 50 feet x 1 pushing portable monitor, 15 feet x 1 with cane. 1 steanding rest break after first 75 feet

## 2022-04-21 NOTE — PHYSICAL THERAPY INITIAL EVALUATION ADULT - GENERAL OBSERVATIONS, REHAB EVAL
As per PAUL Rogers patient cleared for PT/OOB. Received sitting in chair + telemetry, heplock, oxygen 2LNC, in NAD

## 2022-04-21 NOTE — PROGRESS NOTE ADULT - ASSESSMENT
81 year old Malay speaking female with newly diagnosed severe AS, presented to her cardiologist, Dr. Go Curiel,with complaints of worsening ROE, fatigue, dizziness, near syncope, and reduced exercise capacity. She can walk 1/2 block before needing to stop and rest. She also admits to occasional chest tightness, described as pressure. An echocardiogram preformed on 3/7/22 revealed severe AS with peak velocity 4m.s and ROSAURA 0.8 cm 2. Patient was referred to [facility], Dr. Mcdermott, for further evaluation of her AS.  On 4/20/20 patient underwent TF TAVR, no rhythm issues intraop.  Patient brought to [facility] for postop recovery.  Patient had rodriguez placed for retention.  POD 1 rodriguez removed, echo performed see results below, Nifedipine and coreg restarted.  Passed TOV. Patient ambulating independently with room air, tolerating diet, pain controlled, urinating and having bowel movements.  Per Dr. Mcdermott, Patient stable for discharge home.

## 2022-04-21 NOTE — DISCHARGE NOTE PROVIDER - CARE PROVIDERS DIRECT ADDRESSES
[email or web address]

## 2022-04-21 NOTE — PROGRESS NOTE ADULT - SUBJECTIVE AND OBJECTIVE BOX
Patient discussed on morning rounds with Dr. Mcdermott     Operation / Date: 4/20 TAVR    Surgeon: Baldemar    Referring Physician: Moisés    SUBJECTIVE ASSESSMENT:  81y Female seen and examined at bedside.  Patient complaining of generalized pain.  Denies chest pain, shortness of breath, nausea, vomiting.    Hospital Course:  81 year old Amharic speaking female with newly diagnosed severe AS, presented to her cardiologist, Dr. Go Curiel,with complaints of worsening ROE, fatigue, dizziness, near syncope, and reduced exercise capacity. She can walk 1/2 block before needing to stop and rest. She also admits to occasional chest tightness, described as pressure. An echocardiogram preformed on 3/7/22 revealed severe AS with peak velocity 4m.s and ROSAURA 0.8 cm 2. Patient was referred to [facility], Dr. Mcdermott, for further evaluation of her AS.  On 4/20/20 patient underwent TF TAVR, no rhythm issues intraop.  Patient brought to [facility] for postop recovery.  Patient had rodriguez placed for retention.  POD 1 rodriguez removed, echo performed see results below, Nifedipine and coreg restarted.  Passed TOV. Patient ambulating independently with room air, tolerating diet, pain controlled, urinating and having bowel movements.  Per Dr. Mcdermott, Patient stable for discharge home.     Vital Signs Last 24 Hrs  T(C): 36.8 (21 Apr 2022 10:11), Max: 36.8 (21 Apr 2022 10:11)  T(F): 98.2 (21 Apr 2022 10:11), Max: 98.2 (21 Apr 2022 10:11)  HR: 88 (21 Apr 2022 12:00) (63 - 91)  BP: 123/58 (21 Apr 2022 12:00) (120/58 - 183/77)  BP(mean): 84 (21 Apr 2022 12:00) (83 - 114)  RR: 20 (21 Apr 2022 12:00) (8 - 29)  SpO2: 93% (21 Apr 2022 12:00) (92% - 100%)  I&O's Detail    20 Apr 2022 07:01  -  21 Apr 2022 07:00  --------------------------------------------------------  IN:    IV PiggyBack: 100 mL    NiCARdipine: 475 mL    Oral Fluid: 300 mL    sodium chloride 0.9%: 70 mL  Total IN: 945 mL    OUT:    Indwelling Catheter - Urethral (mL): 1825 mL    Voided (mL): 0 mL  Total OUT: 1825 mL    Total NET: -880 mL      21 Apr 2022 07:01  -  21 Apr 2022 12:28  --------------------------------------------------------  IN:  Total IN: 0 mL    OUT:    Voided (mL): 50 mL  Total OUT: 50 mL    Total NET: -50 mL      PHYSICAL EXAM:    GEN: NAD, looks comfortable  Psych: Mood appropriate  Neuro: A&Ox3.  No focal deficits.  Moving all extremities.   HEENT: No obvious abnormalities  CV: S1S2, regular, no murmurs appreciated.  No carotid bruits.  No JVD  Lungs: Clear B/L.  No wheezing, rales or rhonchi  ABD: Soft, non-tender, non-distended.  +Bowel sounds  EXT: Warm and well perfused.  No peripheral edema noted  Musculoskeletal: Moving all extremities with normal ROM, no joint swelling  PV: Pedal pulses palpable  Incision: bilateral groins soft, no hematoma    LABS:                        8.4    12.17 )-----------( 294      ( 21 Apr 2022 03:14 )             25.6       COUMADIN:  No.        DOSE:                  INDICATION:                GOAL INR:    PT/INR - ( 21 Apr 2022 03:14 )   PT: 14.8 sec;   INR: 1.24          PTT - ( 21 Apr 2022 03:14 )  PTT:28.6 sec    04-21    135  |  101  |  9   ----------------------------<  101<H>  4.3   |  22  |  0.76    Ca    8.2<L>      21 Apr 2022 03:14  Phos  4.1     04-21  Mg     1.9     04-21    TPro  6.9  /  Alb  2.9<L>  /  TBili  0.2  /  DBili  x   /  AST  24  /  ALT  16  /  AlkPhos  93  04-21          MEDICATIONS  (STANDING):  aspirin enteric coated 81 milliGRAM(s) Oral daily  carvedilol 3.125 milliGRAM(s) Oral every 12 hours  ceFAZolin   IVPB 2000 milliGRAM(s) IV Intermittent every 8 hours  clopidogrel Tablet 75 milliGRAM(s) Oral daily  dextrose 5%. 1000 milliLiter(s) (100 mL/Hr) IV Continuous <Continuous>  dextrose 5%. 1000 milliLiter(s) (50 mL/Hr) IV Continuous <Continuous>  dextrose 50% Injectable 25 Gram(s) IV Push once  dextrose 50% Injectable 12.5 Gram(s) IV Push once  dextrose 50% Injectable 25 Gram(s) IV Push once  glucagon  Injectable 1 milliGRAM(s) IntraMuscular once  heparin   Injectable 5000 Unit(s) SubCutaneous every 8 hours  insulin lispro (ADMELOG) corrective regimen sliding scale   SubCutaneous three times a day before meals  NIFEdipine XL 90 milliGRAM(s) Oral daily  pantoprazole    Tablet 40 milliGRAM(s) Oral before breakfast  sodium chloride 0.9% lock flush 3 milliLiter(s) IV Push every 8 hours  sodium chloride 0.9%. 1000 milliLiter(s) (10 mL/Hr) IV Continuous <Continuous>      Discharge CXR:    Discharge ECHO:

## 2022-04-22 ENCOUNTER — NON-APPOINTMENT (OUTPATIENT)
Age: 82
End: 2022-04-22

## 2022-04-22 ENCOUNTER — INPATIENT (INPATIENT)
Facility: HOSPITAL | Age: 82
LOS: 1 days | Discharge: HOME HEALTH | DRG: 811 | End: 2022-04-24
Attending: INTERNAL MEDICINE | Admitting: INTERNAL MEDICINE
Payer: MEDICARE

## 2022-04-22 ENCOUNTER — TRANSCRIPTION ENCOUNTER (OUTPATIENT)
Age: 82
End: 2022-04-22

## 2022-04-22 VITALS
RESPIRATION RATE: 18 BRPM | HEART RATE: 76 BPM | OXYGEN SATURATION: 97 % | SYSTOLIC BLOOD PRESSURE: 127 MMHG | WEIGHT: 145.06 LBS | HEIGHT: 58 IN | TEMPERATURE: 99 F | DIASTOLIC BLOOD PRESSURE: 57 MMHG

## 2022-04-22 DIAGNOSIS — Z96.7 PRESENCE OF OTHER BONE AND TENDON IMPLANTS: Chronic | ICD-10-CM

## 2022-04-22 DIAGNOSIS — Z98.891 HISTORY OF UTERINE SCAR FROM PREVIOUS SURGERY: Chronic | ICD-10-CM

## 2022-04-22 DIAGNOSIS — J90 PLEURAL EFFUSION, NOT ELSEWHERE CLASSIFIED: ICD-10-CM

## 2022-04-22 LAB
ALBUMIN SERPL ELPH-MCNC: 3.2 G/DL — LOW (ref 3.3–5)
ALP SERPL-CCNC: 91 U/L — SIGNIFICANT CHANGE UP (ref 40–120)
ALT FLD-CCNC: 11 U/L — SIGNIFICANT CHANGE UP (ref 10–45)
ANION GAP SERPL CALC-SCNC: 10 MMOL/L — SIGNIFICANT CHANGE UP (ref 5–17)
ANISOCYTOSIS BLD QL: SLIGHT — SIGNIFICANT CHANGE UP
AST SERPL-CCNC: 26 U/L — SIGNIFICANT CHANGE UP (ref 10–40)
BASOPHILS # BLD AUTO: 0.11 K/UL — SIGNIFICANT CHANGE UP (ref 0–0.2)
BASOPHILS NFR BLD AUTO: 0.9 % — SIGNIFICANT CHANGE UP (ref 0–2)
BILIRUB SERPL-MCNC: 0.3 MG/DL — SIGNIFICANT CHANGE UP (ref 0.2–1.2)
BUN SERPL-MCNC: 13 MG/DL — SIGNIFICANT CHANGE UP (ref 7–23)
CALCIUM SERPL-MCNC: 8.4 MG/DL — SIGNIFICANT CHANGE UP (ref 8.4–10.5)
CHLORIDE SERPL-SCNC: 98 MMOL/L — SIGNIFICANT CHANGE UP (ref 96–108)
CO2 SERPL-SCNC: 22 MMOL/L — SIGNIFICANT CHANGE UP (ref 22–31)
CREAT SERPL-MCNC: 0.87 MG/DL — SIGNIFICANT CHANGE UP (ref 0.5–1.3)
EGFR: 66 ML/MIN/1.73M2 — SIGNIFICANT CHANGE UP
EOSINOPHIL # BLD AUTO: 0.11 K/UL — SIGNIFICANT CHANGE UP (ref 0–0.5)
EOSINOPHIL NFR BLD AUTO: 0.9 % — SIGNIFICANT CHANGE UP (ref 0–6)
GIANT PLATELETS BLD QL SMEAR: PRESENT — SIGNIFICANT CHANGE UP
GLUCOSE SERPL-MCNC: 128 MG/DL — HIGH (ref 70–99)
HCT VFR BLD CALC: 23.8 % — LOW (ref 34.5–45)
HGB BLD-MCNC: 7.8 G/DL — LOW (ref 11.5–15.5)
LYMPHOCYTES # BLD AUTO: 1.76 K/UL — SIGNIFICANT CHANGE UP (ref 1–3.3)
LYMPHOCYTES # BLD AUTO: 14 % — SIGNIFICANT CHANGE UP (ref 13–44)
MACROCYTES BLD QL: SLIGHT — SIGNIFICANT CHANGE UP
MANUAL SMEAR VERIFICATION: SIGNIFICANT CHANGE UP
MCHC RBC-ENTMCNC: 29.1 PG — SIGNIFICANT CHANGE UP (ref 27–34)
MCHC RBC-ENTMCNC: 32.8 GM/DL — SIGNIFICANT CHANGE UP (ref 32–36)
MCV RBC AUTO: 88.8 FL — SIGNIFICANT CHANGE UP (ref 80–100)
MICROCYTES BLD QL: SLIGHT — SIGNIFICANT CHANGE UP
MONOCYTES # BLD AUTO: 0.77 K/UL — SIGNIFICANT CHANGE UP (ref 0–0.9)
MONOCYTES NFR BLD AUTO: 6.1 % — SIGNIFICANT CHANGE UP (ref 2–14)
NEUTROPHILS # BLD AUTO: 9.84 K/UL — HIGH (ref 1.8–7.4)
NEUTROPHILS NFR BLD AUTO: 78.1 % — HIGH (ref 43–77)
OVALOCYTES BLD QL SMEAR: SLIGHT — SIGNIFICANT CHANGE UP
PLAT MORPH BLD: ABNORMAL
PLATELET # BLD AUTO: 286 K/UL — SIGNIFICANT CHANGE UP (ref 150–400)
POTASSIUM SERPL-MCNC: 4.2 MMOL/L — SIGNIFICANT CHANGE UP (ref 3.5–5.3)
POTASSIUM SERPL-SCNC: 4.2 MMOL/L — SIGNIFICANT CHANGE UP (ref 3.5–5.3)
PROT SERPL-MCNC: 7.1 G/DL — SIGNIFICANT CHANGE UP (ref 6–8.3)
RBC # BLD: 2.68 M/UL — LOW (ref 3.8–5.2)
RBC # FLD: 13.2 % — SIGNIFICANT CHANGE UP (ref 10.3–14.5)
RBC BLD AUTO: ABNORMAL
SARS-COV-2 RNA SPEC QL NAA+PROBE: NEGATIVE — SIGNIFICANT CHANGE UP
SODIUM SERPL-SCNC: 130 MMOL/L — LOW (ref 135–145)
SPHEROCYTES BLD QL SMEAR: SLIGHT — SIGNIFICANT CHANGE UP
TROPONIN T SERPL-MCNC: 0.01 NG/ML — SIGNIFICANT CHANGE UP (ref 0–0.01)
WBC # BLD: 12.6 K/UL — HIGH (ref 3.8–10.5)
WBC # FLD AUTO: 12.6 K/UL — HIGH (ref 3.8–10.5)

## 2022-04-22 PROCEDURE — 93010 ELECTROCARDIOGRAM REPORT: CPT

## 2022-04-22 PROCEDURE — 99285 EMERGENCY DEPT VISIT HI MDM: CPT

## 2022-04-22 PROCEDURE — 99223 1ST HOSP IP/OBS HIGH 75: CPT

## 2022-04-22 PROCEDURE — 71046 X-RAY EXAM CHEST 2 VIEWS: CPT | Mod: 26

## 2022-04-22 RX ORDER — POTASSIUM CHLORIDE 20 MEQ
10 PACKET (EA) ORAL DAILY
Refills: 0 | Status: DISCONTINUED | OUTPATIENT
Start: 2022-04-23 | End: 2022-04-23

## 2022-04-22 RX ORDER — ASPIRIN/CALCIUM CARB/MAGNESIUM 324 MG
81 TABLET ORAL DAILY
Refills: 0 | Status: DISCONTINUED | OUTPATIENT
Start: 2022-04-22 | End: 2022-04-24

## 2022-04-22 RX ORDER — NIFEDIPINE 30 MG
90 TABLET, EXTENDED RELEASE 24 HR ORAL DAILY
Refills: 0 | Status: DISCONTINUED | OUTPATIENT
Start: 2022-04-22 | End: 2022-04-24

## 2022-04-22 RX ORDER — FUROSEMIDE 40 MG
20 TABLET ORAL ONCE
Refills: 0 | Status: COMPLETED | OUTPATIENT
Start: 2022-04-22 | End: 2022-04-22

## 2022-04-22 RX ORDER — ACETAMINOPHEN 500 MG
650 TABLET ORAL EVERY 6 HOURS
Refills: 0 | Status: DISCONTINUED | OUTPATIENT
Start: 2022-04-22 | End: 2022-04-24

## 2022-04-22 RX ORDER — CLOPIDOGREL BISULFATE 75 MG/1
75 TABLET, FILM COATED ORAL DAILY
Refills: 0 | Status: DISCONTINUED | OUTPATIENT
Start: 2022-04-23 | End: 2022-04-24

## 2022-04-22 RX ORDER — FUROSEMIDE 40 MG
20 TABLET ORAL DAILY
Refills: 0 | Status: DISCONTINUED | OUTPATIENT
Start: 2022-04-23 | End: 2022-04-24

## 2022-04-22 RX ORDER — SODIUM CHLORIDE 9 MG/ML
3 INJECTION INTRAMUSCULAR; INTRAVENOUS; SUBCUTANEOUS EVERY 8 HOURS
Refills: 0 | Status: DISCONTINUED | OUTPATIENT
Start: 2022-04-22 | End: 2022-04-24

## 2022-04-22 RX ORDER — CARVEDILOL PHOSPHATE 80 MG/1
3.12 CAPSULE, EXTENDED RELEASE ORAL EVERY 12 HOURS
Refills: 0 | Status: DISCONTINUED | OUTPATIENT
Start: 2022-04-22 | End: 2022-04-24

## 2022-04-22 RX ORDER — PANTOPRAZOLE SODIUM 20 MG/1
40 TABLET, DELAYED RELEASE ORAL
Refills: 0 | Status: DISCONTINUED | OUTPATIENT
Start: 2022-04-22 | End: 2022-04-24

## 2022-04-22 RX ADMIN — CARVEDILOL PHOSPHATE 3.12 MILLIGRAM(S): 80 CAPSULE, EXTENDED RELEASE ORAL at 18:30

## 2022-04-22 RX ADMIN — SODIUM CHLORIDE 3 MILLILITER(S): 9 INJECTION INTRAMUSCULAR; INTRAVENOUS; SUBCUTANEOUS at 19:17

## 2022-04-22 RX ADMIN — Medication 20 MILLIGRAM(S): at 16:17

## 2022-04-22 NOTE — ED CLERICAL - NS ED CLERK NOTE PRE-ARRIVAL INFORMATION; ADDITIONAL PRE-ARRIVAL INFORMATION
This patient is enrolled in the Follow Your Heart program and has undergone a cardiac surgery procedure and has active care navigation. This patient can be followed up by the care navigation team within 24 hours. To arrange close follow-up or to obtain additional clinical information about this patient, please call the contact number above. Please call the cardiac surgery team once patient is registered at [phone] for consultation PRIOR to disposition decision.  The patient recently underwent a cardiac surgery procedure and the team can assist in acute medical management.

## 2022-04-22 NOTE — H&P ADULT - ASSESSMENT
81 year old Welsh speaking female with newly diagnosed severe AS, presented to her cardiologist, Dr. Go Curiel,with complaints of worsening ROE, fatigue, dizziness, near syncope, and reduced exercise capacity. She can walk 1/2 block before needing to stop and rest. She also admits to occasional chest tightness, described as pressure. An echocardiogram preformed on 3/7/22 revealed severe AS with peak velocity 4m.s and ROSAURA 0.8 cm 2. Patient was referred to [facility], Dr. Mcdermott, for further evaluation of her AS.  On 4/20/20 patient underwent TF TAVR, no rhythm issues intraop.  Patient brought to [facility] for postop recovery.  Patient had rodriguez placed for retention.  POD 1 rodriguez removed, echo performed see results below, Nifedipine and coreg restarted.  Passed TOV. Patient discharges POD 1.    Patient called answering service complaining of chest pressure and shortness of breath.  Patient was able to speak in full sentences.  She was directed to come to office for evalution.  Patient's niece called EMS for transport and patient was brought to ED for evaluation.  Patient on arrival to ED showed no signs of distress.  Patient reports her symptoms are same as prior to TAVR.  Patient in ED denies chest pain, shortness of breath, nausea, vomiting.  Labs in ED WNL.  CXR with small bilateral effusions.      Plan:  Problem 1: severe aortic stenosis  s/p TAVR on 4/20, discharged POD 0  Place patient under 23hrs observation  Continue ASA, Plavix    Problem 2: Shortness of breath  Bilateral small pleural effusion on CXR.  Lasix 20mg IV x1, have patient resume Lasix 20mg PO QD from 4/23    Problem 3: Hypertension  Continue Coreg and nifedepine      Problem 4: Hyperlipidemia  Continue statin    Patient discussed with Dr. Mcdermott

## 2022-04-22 NOTE — H&P ADULT - HISTORY OF PRESENT ILLNESS
81 year old Mohawk speaking female with newly diagnosed severe AS, presented to her cardiologist, Dr. Go Curiel,with complaints of worsening ROE, fatigue, dizziness, near syncope, and reduced exercise capacity. She can walk 1/2 block before needing to stop and rest. She also admits to occasional chest tightness, described as pressure. An echocardiogram preformed on 3/7/22 revealed severe AS with peak velocity 4m.s and ROSAURA 0.8 cm 2. Patient was referred to [facility], Dr. Mcdermott, for further evaluation of her AS.  On 4/20/20 patient underwent TF TAVR, no rhythm issues intraop.  Patient brought to [facility] for postop recovery.  Patient had rodriguez placed for retention.  POD 1 rodriguez removed, echo performed see results below, Nifedipine and coreg restarted.  Passed TOV. Patient discharges POD 1.    Patient called answering service complaining of chest pressure and shortness of breath.  Patient was able to speak in full sentences.  She was directed to come to office for evaluation  Patient's niece called EMS for transport and patient was brought to ED for evaluation.  Patient on arrival to ED showed no signs of distress.  Patient reports her symptoms are same as prior to TAVR.  Patient in ED denies chest pain, shortness of breath, nausea, vomiting.  Labs in ED WNL.  CXR with small bilateral effusions.

## 2022-04-22 NOTE — PATIENT PROFILE ADULT - FALL HARM RISK - HARM RISK INTERVENTIONS

## 2022-04-22 NOTE — ED PROVIDER NOTE - CLINICAL SUMMARY MEDICAL DECISION MAKING FREE TEXT BOX
Impression: PMHx severe AS s/p TAVR with persistent chest tightness and SOB, which she experienced prior to surgery. Pt was seen by CT surgery PA in ED who recommended basic labs including troponin and CXR. Will await further recommendations. Impression: PMHx severe AS s/p TAVR on 4/20 with persistent chest tightness and SOB, which she experienced prior to surgery. AVSS. EKG non-ischemic. + slight leukocytosis to 12, hyponatremia to 130, normal trop. CXR + for new b/l effusions. Pt given lasix iv. Pt seen by Dr. Mcdermott in ed; will admit to his svc for continued diuresis/ monitoring.

## 2022-04-22 NOTE — H&P ADULT - NSHPREVIEWOFSYSTEMS_GEN_ALL_CORE
Review of Systems  CONSTITUTIONAL:  Denies Fevers / chills, sweats, fatigue, weight loss, weight gain                                      NEURO:  Denies parethesias, seizures, syncope, confusion                                                                                EYES:  Denies Blurry vision, discharge, pain, loss of vision                                                                                    ENMT:  Denies Difficulty hearing, vertigo, dysphagia, epistaxis, recent dental work                                       CV:  Denies Chest pain, palpitations, ROE, orthopnea                                                                                          RESPIRATORY:  Denies Wheezing, SOB, cough / sputum, hemoptysis                                                                GI:  Denies Nausea, vomiting, diarrhea, constipation, melena, difficulty swallowing                                               : Denies Hematuria, dysuria, urgency, incontinence                                                                                         MUSCULOSKELETAL:  Denies arthritis, joint swelling, muscle weakness                                                             SKIN/BREAST:  Denies rash, itching, hair loss, masses                                                                                            PSYCH:  Denies depression, anxiety, suicidal ideation                                                                                               HEME/LYMPH:  Denies bruises easily, enlarged lymph nodes, tender lymph nodes                                        ENDOCRINE:  Denies cold intolerance, heat intolerance, polydipsia

## 2022-04-22 NOTE — ED ADULT NURSE NOTE - CHIEF COMPLAINT QUOTE
Pt Belarusian speaking only complains of left sided chest pain since last night. Pt was D/C from the hospital s/p TAVR/ PCI (unknown stents placement). pt had followup appointment with MD Mcdermott. However, pt unaware that ambulance would bring her to the hospital instead.

## 2022-04-22 NOTE — ED ADULT TRIAGE NOTE - CHIEF COMPLAINT QUOTE
Pt Azeri speaking only complains of left sided chest pain since last night. Pt was D/C from the hospital s/p TAVR/ PCI (unknown stents placement). pt had followup appointment with MD Mcdermott. However, pt unaware that ambulance would bring her to the hospital instead.

## 2022-04-22 NOTE — ED PROVIDER NOTE - PHYSICAL EXAMINATION
VITAL SIGNS: I have reviewed nursing notes and confirm.  CONSTITUTIONAL: Well appearing, in no acute distress.   SKIN:  warm and dry, no acute rash.   HEAD:  normocephalic, atraumatic.  EYES: EOM intact; conjunctiva and sclera clear.  ENT: No nasal discharge; airway clear.   NECK: Supple; non tender.  CARD: S1, S2 normal; no gallops, or rubs. Regular rate and rhythm. +murmur  RESP:  Clear to auscultation b/l, no wheezes, rales or rhonchi.  ABD: Normal bowel sounds; soft; non-distended; non-tender; no guarding/ rebound.  EXT: Normal ROM. No clubbing, cyanosis or edema. 2+ pulses to b/l ue/le. No peripheral edema.   NEURO: Alert, oriented, grossly unremarkable  PSYCH: Cooperative, mood and affect appropriate. VITAL SIGNS: I have reviewed nursing notes and confirm.  CONSTITUTIONAL: Elderly f in no acute distress.   SKIN:  warm and dry, no acute rash.   HEAD:  normocephalic, atraumatic.  EYES: EOM intact; conjunctiva and sclera clear.  ENT: No nasal discharge; airway clear.   NECK: Supple; non tender.  CARD: S1, S2 normal; no gallops, or rubs. Regular rate and rhythm. +murmur  RESP:  Clear to auscultation b/l, no wheezes, rales or rhonchi.  ABD: Normal bowel sounds; soft; non-distended; non-tender; no guarding/ rebound.  EXT: Normal ROM. No clubbing, cyanosis or edema. 2+ pulses to b/l ue/le.  NEURO: Alert, oriented, grossly unremarkable  PSYCH: Cooperative, mood and affect appropriate.

## 2022-04-22 NOTE — CONSULT NOTE ADULT - SUBJECTIVE AND OBJECTIVE BOX
Surgeon:    Requesting Physician:    HISTORY OF PRESENT ILLNESS (Need 4):  81y Female    PAST MEDICAL & SURGICAL HISTORY:  HTN (hypertension)    Aortic stenosis, severe    Diastolic CHF    Glaucoma    Arthritis    Nephrolithiasis    Fixation hardware in leg  History of leg surgery with metal placed -right    History of         MEDICATIONS  (STANDING):    MEDICATIONS  (PRN):      Allergies    No Known Allergies    Intolerances        SOCIAL HISTORY:  Smoker:  YES / NO        PACK YEARS:                         WHEN QUIT?  ETOH use:  YES / NO               FREQUENCY / QUANTITY:  Ilicit Drug use:  YES / NO  Occupation:  Assisted device use (Cane / Walker):  Live with:    FAMILY HISTORY:      Review of Systems (Need 10):  CONSTITUTIONAL: Denies fevers / chills, sweats, fatigue, weight loss, weight gain                                       NEURO:  Denies parathesias, seizures, syncope, confusion                                                                                  EYES:  Denies blurry vision, discharge, pain, loss of vision                                                                                    ENMT:  Denies difficulty hearing, vertigo, dysphagia, epistaxis, recent dental work                                       CV:  Denies chest pain, palpitations, ROE, orthopnea                                                                                           RESPIRATORY:  Denies wWheezing, SOB, cough / sputum, hemoptysis                                                               GI:  Denies nausea, vomiting, diarrhea, constipation, melena                                                                          : Denies hematuria, dysuria, urgency, incontinence                                                                                          MUSKULOSKELETAL:  Denies arthritis, joint swelling, muscle weakness                                                             SKIN/BREAST:  Denies rash, itching, hair loss, masses                                                                                              PSYCH:  Denies depression, anxiety, suicidal ideation                                                                                                HEME/LYMPH:  Denies bruises easily, enlarged lymph nodes, tender lymph nodes                                          ENDOCRINE:  Denies cold intolerance, heat intolerance, polydipsia                                                                      Vital Signs Last 24 Hrs  T(C): 37 (2022 13:01), Max: 37 (2022 13:01)  T(F): 98.6 (2022 13:01), Max: 98.6 (2022 13:01)  HR: 76 (2022 13:01) (76 - 76)  BP: 127/57 (2022 13:01) (127/57 - 127/57)  BP(mean): --  RR: 18 (2022 13:01) (18 - 18)  SpO2: 97% (2022 13:01) (97% - 97%)    Physical Exam (Need 8)  CONSTITUTIONAL:                                                                          WNL  NEURO:                                                                                             WNL                      EYES:                                                                                                  WNL  ENMT:                                                                                                WNL  CV:                                                                                                      WNL  RESPIRATORY:                                                                                  WNL  GI:                                                                                                       WNL  : UNDERWOOD + / -                                                                                 WNL  MUSKULOSKELETAL:                                                                       WNL  SKIN / BREAST:                                                                                 WNL                                                          LABS:                        7.8    12.60 )-----------( 286      ( 2022 14:08 )             23.8     04-22    130<L>  |  98  |  13  ----------------------------<  128<H>  4.2   |  22  |  0.87    Ca    8.4      2022 14:08  Phos  4.1     04-21  Mg     1.9     04-21    TPro  7.1  /  Alb  3.2<L>  /  TBili  0.3  /  DBili  x   /  AST  26  /  ALT  11  /  AlkPhos  91  04-22    PT/INR - ( 2022 03:14 )   PT: 14.8 sec;   INR: 1.24          PTT - ( 2022 03:14 )  PTT:28.6 sec    CARDIAC MARKERS ( 2022 14:08 )  x     / 0.01 ng/mL / x     / x     / x              RADIOLOGY & ADDITIONAL STUDIES:  CAROTID U/S:    CXR:    CT Scan:    EKG:    TTE / KODI:    Cardiac Cath: Surgeon: Baldemar    Requesting Physician: Ree    HISTORY OF PRESENT ILLNESS (Need 4):  81 year old Sammarinese speaking female with newly diagnosed severe AS, presented to her cardiologist, Dr. Go Curiel,with complaints of worsening ROE, fatigue, dizziness, near syncope, and reduced exercise capacity. She can walk 1/2 block before needing to stop and rest. She also admits to occasional chest tightness, described as pressure. An echocardiogram preformed on 3/7/22 revealed severe AS with peak velocity 4m.s and ROSAURA 0.8 cm 2. Patient was referred to [facility], Dr. Mcdermott, for further evaluation of her AS.  On 20 patient underwent TF TAVR, no rhythm issues intraop.  Patient brought to [facility] for postop recovery.  Patient had rodriguez placed for retention.  POD 1 rodriguez removed, echo performed see results below, Nifedipine and coreg restarted.  Passed TOV. Patient discharges POD 1.    Patient called answering service complaining of chest pressure and shortness of breath.  Patient was able to speak in full sentences.  She was directed to come to office for evalution.  Patient's niece called EMS for transport and patient was brought to ED for evaluation.  Patient on arrival to ED showed no signs of distress.  Patient reports her symptoms are same as prior to TAVR.  Patient in ED denies chest pain, shortness of breath, nausea, vomiting.  Labs in ED WNL.  CXR with small bilateral effusions.        PAST MEDICAL & SURGICAL HISTORY:  HTN (hypertension)    Aortic stenosis, severe    Diastolic CHF    Glaucoma    Arthritis    Nephrolithiasis    Fixation hardware in leg  History of leg surgery with metal placed -right    History of         MEDICATIONS  (STANDING):    MEDICATIONS  (PRN):      Allergies    No Known Allergies    Intolerances        SOCIAL HISTORY:  Smoker:  NO        ETOH use:  NO               Ilicit Drug use:  NO      FAMILY HISTORY:      Review of Systems (Need 10):  CONSTITUTIONAL: Denies fevers / chills, sweats, fatigue, weight loss, weight gain                                       NEURO:  Denies parathesias, seizures, syncope, confusion                                                                                  EYES:  Denies blurry vision, discharge, pain, loss of vision                                                                                    ENMT:  Denies difficulty hearing, vertigo, dysphagia, epistaxis, recent dental work                                       CV:  Denies chest pain, palpitations, ROE, orthopnea                                                                                           RESPIRATORY:  Denies wWheezing, SOB, cough / sputum, hemoptysis                                                               GI:  Denies nausea, vomiting, diarrhea, constipation, melena                                                                          : Denies hematuria, dysuria, urgency, incontinence                                                                                          MUSKULOSKELETAL:  Denies arthritis, joint swelling, muscle weakness                                                             SKIN/BREAST:  Denies rash, itching, hair loss, masses                                                                                              PSYCH:  Denies depression, anxiety, suicidal ideation                                                                                                HEME/LYMPH:  Denies bruises easily, enlarged lymph nodes, tender lymph nodes                                          ENDOCRINE:  Denies cold intolerance, heat intolerance, polydipsia                                                                      Vital Signs Last 24 Hrs  T(C): 37 (2022 13:01), Max: 37 (2022 13:01)  T(F): 98.6 (2022 13:01), Max: 98.6 (2022 13:01)  HR: 76 (:) (76 - 76)  BP: 127/57 (2022 13:) (127/57 - 127/57)  BP(mean): --  RR: 18 (:) (18 - 18)  SpO2: 97% (:) (97% - 97%)    Physical Exam (Need 8)  GEN: NAD, looks comfortable  Psych: Mood appropriate  Neuro: A&Ox3.  No focal deficits.  Moving all extremities.   HEENT: No obvious abnormalities  CV: S1S2, regular, no murmurs appreciated.  No carotid bruits.  No JVD  Lungs: Clear B/L.  No wheezing, rales or rhonchi  ABD: Soft, non-tender, non-distended.  +Bowel sounds  EXT: Warm and well perfused.  No peripheral edema noted  Musculoskeletal: Moving all extremities with normal ROM, no joint swelling  PV: Pedal pulses palpable  Incision: bilateral groins soft, no hematoma                                                          LABS:                        7.8    12.60 )-----------( 286      ( 2022 14:08 )             23.8     04-22    130<L>  |  98  |  13  ----------------------------<  128<H>  4.2   |  22  |  0.87    Ca    8.4      2022 14:08  Phos  4.1     04-21  Mg     1.9     04-21    TPro  7.1  /  Alb  3.2<L>  /  TBili  0.3  /  DBili  x   /  AST  26  /  ALT  11  /  AlkPhos  91  04-22    PT/INR - ( 2022 03:14 )   PT: 14.8 sec;   INR: 1.24          PTT - ( 2022 03:14 )  PTT:28.6 sec    CARDIAC MARKERS ( 2022 14:08 )  x     / 0.01 ng/mL / x     / x     / x

## 2022-04-22 NOTE — ED ADULT NURSE NOTE - OBJECTIVE STATEMENT
Pt presents to ER from EMS with chest tightness. Pt had a PCI placement on Wednesday and was discharged from hospital. Today she had a followup appointment upstairs but daughter was worried to take a taxi. They instead called EMS to bring them to their appointment but was instead brought here. Pt reports chest tightness she is experiencing is the same as before she had PCI placement. Denies SOB, pain, other symptoms. Vitals stable.

## 2022-04-22 NOTE — ED PROVIDER NOTE - OBJECTIVE STATEMENT
80 y/o F with PMHx diastolic CHF, HTN, and severe AS s/p TAVR presents to ED with c/o chest congestion / tightness and SOB, which she had prior to surgery. Pt noted persistence of symptoms after surgery. Pt was supposed to follow up with CT surgeon today, however was brought to ED instead of the office. Denies fever, chills, URI symptoms, leg swelling, vomiting, diarrhea, or abdominal pain. 80 y/o F with PMHx diastolic CHF, HTN, and severe AS s/p TAVR on 4/20, presents to ED with c/o chest congestion / tightness and SOB x several weeks, which she had prior to surgery. Pt noted persistence of symptoms after surgery. Pt was supposed to follow up with Dr. Mcdermott in office today, however was brought to ED instead of the office. Denies fever, chills, URI symptoms, leg swelling, pain, vomiting, diarrhea, or abdominal pain.

## 2022-04-22 NOTE — H&P ADULT - NSHPPHYSICALEXAM_GEN_ALL_CORE
ICU Vital Signs Last 24 Hrs  T(C): 37 (22 Apr 2022 13:01), Max: 37 (22 Apr 2022 13:01)  T(F): 98.6 (22 Apr 2022 13:01), Max: 98.6 (22 Apr 2022 13:01)  HR: 76 (22 Apr 2022 13:01) (76 - 76)  BP: 127/57 (22 Apr 2022 13:01) (127/57 - 127/57)  BP(mean): --  ABP: --  ABP(mean): --  RR: 18 (22 Apr 2022 13:01) (18 - 18)  SpO2: 97% (22 Apr 2022 13:01) (97% - 97%)  GEN: NAD, looks comfortable  Psych: Mood appropriate  Neuro: A&Ox3.  No focal deficits.  Moving all extremities.   HEENT: No obvious abnormalities  CV: S1S2, regular, no murmurs appreciated.  No carotid bruits.  No JVD  Lungs: Clear B/L.  No wheezing, rales or rhonchi  ABD: Soft, non-tender, non-distended.  +Bowel sounds  EXT: Warm and well perfused.  No peripheral edema noted  Musculoskeletal: Moving all extremities with normal ROM, no joint swelling  PV: Pedal pulses palpable  Incision: bilateral groins soft, no hematoma

## 2022-04-22 NOTE — CONSULT NOTE ADULT - ASSESSMENT
Assesment:  81y Female        Plan:  Problem 1:      Problem 2:      Problem 3:      Problem 4:    I have reviewed clinical labs tests and reports, radiology tests and reports, as well as old patient medical records, and discussed with the refering physician.     Assesment:  81 year old Israeli speaking female with newly diagnosed severe AS, presented to her cardiologist, Dr. Go Curiel,with complaints of worsening ROE, fatigue, dizziness, near syncope, and reduced exercise capacity. She can walk 1/2 block before needing to stop and rest. She also admits to occasional chest tightness, described as pressure. An echocardiogram preformed on 3/7/22 revealed severe AS with peak velocity 4m.s and ROSAURA 0.8 cm 2. Patient was referred to [facility], Dr. Mcdermott, for further evaluation of her AS.  On 4/20/20 patient underwent TF TAVR, no rhythm issues intraop.  Patient brought to [facility] for postop recovery.  Patient had rodriguez placed for retention.  POD 1 rodriguez removed, echo performed see results below, Nifedipine and coreg restarted.  Passed TOV. Patient discharges POD 1.    Patient called answering service complaining of chest pressure and shortness of breath.  Patient was able to speak in full sentences.  She was directed to come to office for evalution.  Patient's niece called EMS for transport and patient was brought to ED for evaluation.  Patient on arrival to ED showed no signs of distress.  Patient reports her symptoms are same as prior to TAVR.  Patient in ED denies chest pain, shortness of breath, nausea, vomiting.  Labs in ED WNL.  CXR with small bilateral effusions.      Plan:  Problem 1: severe aortic stenosis  s/p TAVR on 4/20, discharged POD 0  Labs WNL    Problem 2: Shortness of breath  Bilateral small pleural effusion on CXR.  Lasix 20mg IV x1, have patient resume Lasix 20mg PO QD from 4/23    Problem 3: Hypertension  Continue Coreg and nifedepine      Problem 4: Hyperlipidemia  Continue statin    I have reviewed clinical labs tests and reports, radiology tests and reports, as well as old patient medical records, and discussed with the referring physician.

## 2022-04-23 LAB
ANION GAP SERPL CALC-SCNC: 13 MMOL/L — SIGNIFICANT CHANGE UP (ref 5–17)
BUN SERPL-MCNC: 10 MG/DL — SIGNIFICANT CHANGE UP (ref 7–23)
CALCIUM SERPL-MCNC: 8.4 MG/DL — SIGNIFICANT CHANGE UP (ref 8.4–10.5)
CHLORIDE SERPL-SCNC: 97 MMOL/L — SIGNIFICANT CHANGE UP (ref 96–108)
CO2 SERPL-SCNC: 23 MMOL/L — SIGNIFICANT CHANGE UP (ref 22–31)
CREAT SERPL-MCNC: 0.75 MG/DL — SIGNIFICANT CHANGE UP (ref 0.5–1.3)
EGFR: 79 ML/MIN/1.73M2 — SIGNIFICANT CHANGE UP
GLUCOSE SERPL-MCNC: 106 MG/DL — HIGH (ref 70–99)
HCT VFR BLD CALC: 22.1 % — LOW (ref 34.5–45)
HGB BLD-MCNC: 7.3 G/DL — LOW (ref 11.5–15.5)
MAGNESIUM SERPL-MCNC: 1.8 MG/DL — SIGNIFICANT CHANGE UP (ref 1.6–2.6)
MCHC RBC-ENTMCNC: 28.5 PG — SIGNIFICANT CHANGE UP (ref 27–34)
MCHC RBC-ENTMCNC: 33 GM/DL — SIGNIFICANT CHANGE UP (ref 32–36)
MCV RBC AUTO: 86.3 FL — SIGNIFICANT CHANGE UP (ref 80–100)
NRBC # BLD: 0 /100 WBCS — SIGNIFICANT CHANGE UP (ref 0–0)
PLATELET # BLD AUTO: 268 K/UL — SIGNIFICANT CHANGE UP (ref 150–400)
POTASSIUM SERPL-MCNC: 3.8 MMOL/L — SIGNIFICANT CHANGE UP (ref 3.5–5.3)
POTASSIUM SERPL-SCNC: 3.8 MMOL/L — SIGNIFICANT CHANGE UP (ref 3.5–5.3)
RBC # BLD: 2.56 M/UL — LOW (ref 3.8–5.2)
RBC # FLD: 13.2 % — SIGNIFICANT CHANGE UP (ref 10.3–14.5)
SODIUM SERPL-SCNC: 133 MMOL/L — LOW (ref 135–145)
WBC # BLD: 10.99 K/UL — HIGH (ref 3.8–10.5)
WBC # FLD AUTO: 10.99 K/UL — HIGH (ref 3.8–10.5)

## 2022-04-23 PROCEDURE — 99232 SBSQ HOSP IP/OBS MODERATE 35: CPT

## 2022-04-23 PROCEDURE — 71045 X-RAY EXAM CHEST 1 VIEW: CPT | Mod: 26

## 2022-04-23 PROCEDURE — 74176 CT ABD & PELVIS W/O CONTRAST: CPT | Mod: 26

## 2022-04-23 RX ORDER — MAGNESIUM OXIDE 400 MG ORAL TABLET 241.3 MG
800 TABLET ORAL ONCE
Refills: 0 | Status: COMPLETED | OUTPATIENT
Start: 2022-04-23 | End: 2022-04-23

## 2022-04-23 RX ORDER — HEPARIN SODIUM 5000 [USP'U]/ML
5000 INJECTION INTRAVENOUS; SUBCUTANEOUS EVERY 12 HOURS
Refills: 0 | Status: DISCONTINUED | OUTPATIENT
Start: 2022-04-23 | End: 2022-04-24

## 2022-04-23 RX ORDER — IRON SUCROSE 20 MG/ML
100 INJECTION, SOLUTION INTRAVENOUS EVERY 24 HOURS
Refills: 0 | Status: DISCONTINUED | OUTPATIENT
Start: 2022-04-23 | End: 2022-04-24

## 2022-04-23 RX ORDER — POTASSIUM CHLORIDE 20 MEQ
40 PACKET (EA) ORAL ONCE
Refills: 0 | Status: COMPLETED | OUTPATIENT
Start: 2022-04-23 | End: 2022-04-23

## 2022-04-23 RX ADMIN — SODIUM CHLORIDE 3 MILLILITER(S): 9 INJECTION INTRAMUSCULAR; INTRAVENOUS; SUBCUTANEOUS at 13:50

## 2022-04-23 RX ADMIN — CARVEDILOL PHOSPHATE 3.12 MILLIGRAM(S): 80 CAPSULE, EXTENDED RELEASE ORAL at 18:30

## 2022-04-23 RX ADMIN — Medication 81 MILLIGRAM(S): at 12:06

## 2022-04-23 RX ADMIN — HEPARIN SODIUM 5000 UNIT(S): 5000 INJECTION INTRAVENOUS; SUBCUTANEOUS at 18:16

## 2022-04-23 RX ADMIN — CARVEDILOL PHOSPHATE 3.12 MILLIGRAM(S): 80 CAPSULE, EXTENDED RELEASE ORAL at 05:30

## 2022-04-23 RX ADMIN — Medication 20 MILLIGRAM(S): at 05:32

## 2022-04-23 RX ADMIN — Medication 90 MILLIGRAM(S): at 05:30

## 2022-04-23 RX ADMIN — SODIUM CHLORIDE 3 MILLILITER(S): 9 INJECTION INTRAMUSCULAR; INTRAVENOUS; SUBCUTANEOUS at 21:40

## 2022-04-23 RX ADMIN — MAGNESIUM OXIDE 400 MG ORAL TABLET 800 MILLIGRAM(S): 241.3 TABLET ORAL at 10:04

## 2022-04-23 RX ADMIN — Medication 650 MILLIGRAM(S): at 22:30

## 2022-04-23 RX ADMIN — CLOPIDOGREL BISULFATE 75 MILLIGRAM(S): 75 TABLET, FILM COATED ORAL at 12:05

## 2022-04-23 RX ADMIN — SODIUM CHLORIDE 3 MILLILITER(S): 9 INJECTION INTRAMUSCULAR; INTRAVENOUS; SUBCUTANEOUS at 05:28

## 2022-04-23 RX ADMIN — IRON SUCROSE 210 MILLIGRAM(S): 20 INJECTION, SOLUTION INTRAVENOUS at 14:43

## 2022-04-23 RX ADMIN — Medication 650 MILLIGRAM(S): at 21:43

## 2022-04-23 RX ADMIN — PANTOPRAZOLE SODIUM 40 MILLIGRAM(S): 20 TABLET, DELAYED RELEASE ORAL at 05:32

## 2022-04-23 RX ADMIN — Medication 40 MILLIEQUIVALENT(S): at 10:03

## 2022-04-24 ENCOUNTER — TRANSCRIPTION ENCOUNTER (OUTPATIENT)
Age: 82
End: 2022-04-24

## 2022-04-24 VITALS
RESPIRATION RATE: 18 BRPM | OXYGEN SATURATION: 97 % | HEART RATE: 83 BPM | SYSTOLIC BLOOD PRESSURE: 159 MMHG | DIASTOLIC BLOOD PRESSURE: 70 MMHG

## 2022-04-24 LAB
ANION GAP SERPL CALC-SCNC: 11 MMOL/L — SIGNIFICANT CHANGE UP (ref 5–17)
APTT BLD: 31 SEC — SIGNIFICANT CHANGE UP (ref 27.5–35.5)
BUN SERPL-MCNC: 8 MG/DL — SIGNIFICANT CHANGE UP (ref 7–23)
CALCIUM SERPL-MCNC: 9.2 MG/DL — SIGNIFICANT CHANGE UP (ref 8.4–10.5)
CHLORIDE SERPL-SCNC: 97 MMOL/L — SIGNIFICANT CHANGE UP (ref 96–108)
CO2 SERPL-SCNC: 25 MMOL/L — SIGNIFICANT CHANGE UP (ref 22–31)
CREAT SERPL-MCNC: 0.67 MG/DL — SIGNIFICANT CHANGE UP (ref 0.5–1.3)
EGFR: 87 ML/MIN/1.73M2 — SIGNIFICANT CHANGE UP
GLUCOSE SERPL-MCNC: 120 MG/DL — HIGH (ref 70–99)
HCT VFR BLD CALC: 27.1 % — LOW (ref 34.5–45)
HGB BLD-MCNC: 8.9 G/DL — LOW (ref 11.5–15.5)
INR BLD: 1.15 — SIGNIFICANT CHANGE UP (ref 0.88–1.16)
MAGNESIUM SERPL-MCNC: 1.7 MG/DL — SIGNIFICANT CHANGE UP (ref 1.6–2.6)
MCHC RBC-ENTMCNC: 29.3 PG — SIGNIFICANT CHANGE UP (ref 27–34)
MCHC RBC-ENTMCNC: 32.8 GM/DL — SIGNIFICANT CHANGE UP (ref 32–36)
MCV RBC AUTO: 89.1 FL — SIGNIFICANT CHANGE UP (ref 80–100)
NRBC # BLD: 0 /100 WBCS — SIGNIFICANT CHANGE UP (ref 0–0)
PLATELET # BLD AUTO: 361 K/UL — SIGNIFICANT CHANGE UP (ref 150–400)
POTASSIUM SERPL-MCNC: 3.7 MMOL/L — SIGNIFICANT CHANGE UP (ref 3.5–5.3)
POTASSIUM SERPL-SCNC: 3.7 MMOL/L — SIGNIFICANT CHANGE UP (ref 3.5–5.3)
PROTHROM AB SERPL-ACNC: 13.7 SEC — HIGH (ref 10.5–13.4)
RBC # BLD: 3.04 M/UL — LOW (ref 3.8–5.2)
RBC # FLD: 13.3 % — SIGNIFICANT CHANGE UP (ref 10.3–14.5)
SODIUM SERPL-SCNC: 133 MMOL/L — LOW (ref 135–145)
WBC # BLD: 10.46 K/UL — SIGNIFICANT CHANGE UP (ref 3.8–10.5)
WBC # FLD AUTO: 10.46 K/UL — SIGNIFICANT CHANGE UP (ref 3.8–10.5)

## 2022-04-24 PROCEDURE — 99285 EMERGENCY DEPT VISIT HI MDM: CPT | Mod: 25

## 2022-04-24 PROCEDURE — 85730 THROMBOPLASTIN TIME PARTIAL: CPT

## 2022-04-24 PROCEDURE — 99238 HOSP IP/OBS DSCHRG MGMT 30/<: CPT

## 2022-04-24 PROCEDURE — 85027 COMPLETE CBC AUTOMATED: CPT

## 2022-04-24 PROCEDURE — 71046 X-RAY EXAM CHEST 2 VIEWS: CPT

## 2022-04-24 PROCEDURE — 83735 ASSAY OF MAGNESIUM: CPT

## 2022-04-24 PROCEDURE — 80048 BASIC METABOLIC PNL TOTAL CA: CPT

## 2022-04-24 PROCEDURE — 84484 ASSAY OF TROPONIN QUANT: CPT

## 2022-04-24 PROCEDURE — 99232 SBSQ HOSP IP/OBS MODERATE 35: CPT

## 2022-04-24 PROCEDURE — 71045 X-RAY EXAM CHEST 1 VIEW: CPT

## 2022-04-24 PROCEDURE — 80053 COMPREHEN METABOLIC PANEL: CPT

## 2022-04-24 PROCEDURE — 74176 CT ABD & PELVIS W/O CONTRAST: CPT

## 2022-04-24 PROCEDURE — 87635 SARS-COV-2 COVID-19 AMP PRB: CPT

## 2022-04-24 PROCEDURE — 93005 ELECTROCARDIOGRAM TRACING: CPT

## 2022-04-24 PROCEDURE — 85610 PROTHROMBIN TIME: CPT

## 2022-04-24 PROCEDURE — 36415 COLL VENOUS BLD VENIPUNCTURE: CPT

## 2022-04-24 PROCEDURE — 96374 THER/PROPH/DIAG INJ IV PUSH: CPT

## 2022-04-24 PROCEDURE — 85025 COMPLETE CBC W/AUTO DIFF WBC: CPT

## 2022-04-24 RX ORDER — POLYETHYLENE GLYCOL 3350 17 G/17G
17 POWDER, FOR SOLUTION ORAL
Qty: 238 | Refills: 0
Start: 2022-04-24 | End: 2022-05-07

## 2022-04-24 RX ORDER — HYDRALAZINE HCL 50 MG
5 TABLET ORAL ONCE
Refills: 0 | Status: COMPLETED | OUTPATIENT
Start: 2022-04-24 | End: 2022-04-24

## 2022-04-24 RX ORDER — POTASSIUM CHLORIDE 20 MEQ
40 PACKET (EA) ORAL ONCE
Refills: 0 | Status: DISCONTINUED | OUTPATIENT
Start: 2022-04-24 | End: 2022-04-24

## 2022-04-24 RX ORDER — POTASSIUM CHLORIDE 20 MEQ
40 PACKET (EA) ORAL ONCE
Refills: 0 | Status: COMPLETED | OUTPATIENT
Start: 2022-04-24 | End: 2022-04-24

## 2022-04-24 RX ORDER — MAGNESIUM OXIDE 400 MG ORAL TABLET 241.3 MG
800 TABLET ORAL ONCE
Refills: 0 | Status: COMPLETED | OUTPATIENT
Start: 2022-04-24 | End: 2022-04-24

## 2022-04-24 RX ORDER — FERROUS SULFATE 325(65) MG
1 TABLET ORAL
Qty: 30 | Refills: 0
Start: 2022-04-24 | End: 2022-05-23

## 2022-04-24 RX ADMIN — PANTOPRAZOLE SODIUM 40 MILLIGRAM(S): 20 TABLET, DELAYED RELEASE ORAL at 06:46

## 2022-04-24 RX ADMIN — CLOPIDOGREL BISULFATE 75 MILLIGRAM(S): 75 TABLET, FILM COATED ORAL at 10:17

## 2022-04-24 RX ADMIN — SODIUM CHLORIDE 3 MILLILITER(S): 9 INJECTION INTRAMUSCULAR; INTRAVENOUS; SUBCUTANEOUS at 08:20

## 2022-04-24 RX ADMIN — CARVEDILOL PHOSPHATE 3.12 MILLIGRAM(S): 80 CAPSULE, EXTENDED RELEASE ORAL at 05:08

## 2022-04-24 RX ADMIN — Medication 81 MILLIGRAM(S): at 10:17

## 2022-04-24 RX ADMIN — Medication 40 MILLIEQUIVALENT(S): at 07:48

## 2022-04-24 RX ADMIN — Medication 5 MILLIGRAM(S): at 09:16

## 2022-04-24 RX ADMIN — HEPARIN SODIUM 5000 UNIT(S): 5000 INJECTION INTRAVENOUS; SUBCUTANEOUS at 05:08

## 2022-04-24 RX ADMIN — Medication 90 MILLIGRAM(S): at 05:08

## 2022-04-24 RX ADMIN — Medication 20 MILLIGRAM(S): at 06:46

## 2022-04-24 RX ADMIN — MAGNESIUM OXIDE 400 MG ORAL TABLET 800 MILLIGRAM(S): 241.3 TABLET ORAL at 07:46

## 2022-04-24 NOTE — DISCHARGE NOTE NURSING/CASE MANAGEMENT/SOCIAL WORK - PATIENT PORTAL LINK FT
You can access the FollowMyHealth Patient Portal offered by [facility] by registering at the following website: [email or web address]. By joining [facility]’s FollowMyHealth portal, you will also be able to view your health information using other applications (apps) compatible with our system.

## 2022-04-24 NOTE — DISCHARGE NOTE PROVIDER - NSDCHOSPICE_GEN_A_CORE
From: Jeremy Grimes  To: Randy Garcia MD  Sent: 1/5/2021 8:59 AM EST  Subject: Prescription Question    Hello. Could you send a script for a generic version of Premarin to [facility]?  Thx. No

## 2022-04-24 NOTE — DISCHARGE NOTE PROVIDER - NSDCFUSCHEDAPPT_GEN_ALL_CORE_FT
KIKO BORGES ; 04/25/2022 ; [facility]  KIKO BORGES ; 05/02/2022 ; [facility] [address]

## 2022-04-24 NOTE — DISCHARGE NOTE PROVIDER - NSDCCPCAREPLAN_GEN_ALL_CORE_FT
PRINCIPAL DISCHARGE DIAGNOSIS  Diagnosis: Chest pain  Assessment and Plan of Treatment:       SECONDARY DISCHARGE DIAGNOSES  Diagnosis: Dyspnea  Assessment and Plan of Treatment:

## 2022-04-24 NOTE — DISCHARGE NOTE NURSING/CASE MANAGEMENT/SOCIAL WORK - NSDCPEFALRISK_GEN_ALL_CORE
For information on Fall & Injury Prevention, visit: [email or web address] OR  [email or web address] OR  https://www.cdc.gov/steadi/patient.html Yes

## 2022-04-24 NOTE — DISCHARGE NOTE PROVIDER - HOSPITAL COURSE
81 year old Mohawk speaking female with newly diagnosed severe AS, presented to her cardiologist, Dr. Go Curiel,with complaints of worsening ROE, fatigue, dizziness, near syncope, and reduced exercise capacity. She can walk 1/2 block before needing to stop and rest. She also admits to occasional chest tightness, described as pressure. An echocardiogram preformed on 3/7/22 revealed severe AS with peak velocity 4m.s and ROSAURA 0.8 cm 2. Patient was referred to [facility], Dr. Mcdermott, for further evaluation of her AS.  On 4/20/20 patient underwent TF TAVR, no rhythm issues intraop.  Patient brought to [facility] for postop recovery.  Patient had rodriguez placed for retention.  POD 1 rodriguez removed, echo performed see results below, Nifedipine and coreg restarted.  Patient discharged on POD 1. Patient called answering service on 4/22/22 complaining of chest pressure and shortness of breath.  She was able to speak in full sentences.  She was directed to come to office for evaluation  Patient's niece called EMS for transport and patient was brought to ED for evaluation.  Patient on arrival to ED showed no signs of distress.  Patient reports her symptoms are same as prior to TAVR. Admitted to [facility] for monitoring. She was found to be anemic, but was not symptomatic (not orthostatic, hypotensive, or tachycardic). Started on IV iron infusions and closely monitored. H/H improved. Patient's symptoms improved. Per Dr. Hinojosa, patient medically stable for discharge home today, 4/24/22 with family support.    Over 35 minutes was spent with the patient reviewing the discharge material including medications, follow up appointments, recovery, concerning symptoms, and how to contact their health care providers if they have questions

## 2022-04-24 NOTE — DISCHARGE NOTE PROVIDER - PROVIDER TOKENS
PROVIDER:[TOKEN:[[id number]],SCHEDULEDAPPT:[05/02/2022],SCHEDULEDAPPTTIME:[11:15 AM]],PROVIDER:[TOKEN:[[id number]],FOLLOWUP:[1 week]]

## 2022-04-24 NOTE — PROGRESS NOTE ADULT - SUBJECTIVE AND OBJECTIVE BOX
Patient discussed on morning rounds with Dr. Hinojosa    Operation / Date: TAVR 4/20/22    SUBJECTIVE ASSESSMENT:  81y Female seen and examined at bedside. Communicated via . She complains of chest heaviness and dyspnea on exertion.      Vital Signs Last 24 Hrs  T(C): 36.8 (23 Apr 2022 13:47), Max: 36.9 (23 Apr 2022 09:21)  T(F): 98.3 (23 Apr 2022 13:47), Max: 98.4 (23 Apr 2022 09:21)  HR: 86 (23 Apr 2022 13:35) (83 - 98)  BP: 168/71 (23 Apr 2022 13:35) (138/65 - 176/76)  BP(mean): 102 (23 Apr 2022 13:35) (90 - 110)  RR: 12 (23 Apr 2022 13:35) (2 - 20)  SpO2: 96% (23 Apr 2022 13:35) (93% - 97%)  I&O's Detail    23 Apr 2022 07:01  -  23 Apr 2022 15:33  --------------------------------------------------------  IN:    Oral Fluid: 420 mL  Total IN: 420 mL    OUT:  Total OUT: 0 mL    Total NET: 420 mL          CHEST TUBE: no  KENNETH DRAIN:  no  EPICARDIAL WIRES: no  TIE DOWNS: no  UNDERWOOD: no      PHYSICAL EXAM:  GEN: NAD, looks comfortable  Psych: Mood appropriate  Neuro: A&Ox3.  No focal deficits.  Moving all extremities.   HEENT: No obvious abnormalities  CV: S1S2, regular, no murmurs appreciated.  No carotid bruits.  No JVD  Lungs: Clear B/L.  No wheezing, rales or rhonchi  ABD: Soft, non-tender, non-distended.  +Bowel sounds  EXT: Warm and well perfused.  No peripheral edema noted  Musculoskeletal: Moving all extremities with normal ROM, no joint swelling  PV: Pedal pulses palpable  Incision Sites: Bilateral groins with old hematoma, very soft, nontender to palpation      LABS:                        7.3    10.99 )-----------( 268      ( 23 Apr 2022 05:37 )             22.1       COUMADIN:  no        04-23    133<L>  |  97  |  10  ----------------------------<  106<H>  3.8   |  23  |  0.75    Ca    8.4      23 Apr 2022 05:37  Mg     1.8     04-23    TPro  7.1  /  Alb  3.2<L>  /  TBili  0.3  /  DBili  x   /  AST  26  /  ALT  11  /  AlkPhos  91  04-22          MEDICATIONS  (STANDING):  aspirin enteric coated 81 milliGRAM(s) Oral daily  carvedilol 3.125 milliGRAM(s) Oral every 12 hours  clopidogrel Tablet 75 milliGRAM(s) Oral daily  furosemide    Tablet 20 milliGRAM(s) Oral daily  iron sucrose IVPB 100 milliGRAM(s) IV Intermittent every 24 hours  NIFEdipine XL 90 milliGRAM(s) Oral daily  pantoprazole    Tablet 40 milliGRAM(s) Oral before breakfast  sodium chloride 0.9% lock flush 3 milliLiter(s) IV Push every 8 hours    MEDICATIONS  (PRN):  acetaminophen     Tablet .. 650 milliGRAM(s) Oral every 6 hours PRN Severe Pain (7 - 10)        RADIOLOGY & ADDITIONAL TESTS:  CT A/P without contrast 4/23/22  IMPRESSION:  1.  Postoperative fat infiltration in bilateral groin soft tissues. No   groin or retroperitoneal hematoma.  2.  Small bilateral pleural effusions.
Patient discussed on morning rounds with Dr. Hinojosa    Operation / Date: TAVR 4/20/22    SUBJECTIVE ASSESSMENT:  81y Female seen and examined at bedside. Offers no complaints. Ready for discharge today. Denies CP/SOB/N/V/D/dizziness/cough/fever/chills.        Vital Signs Last 24 Hrs  T(C): 36.4 (24 Apr 2022 06:40), Max: 36.9 (23 Apr 2022 09:21)  T(F): 97.6 (24 Apr 2022 06:40), Max: 98.4 (23 Apr 2022 09:21)  HR: 80 (24 Apr 2022 06:45) (72 - 92)  BP: 181/78 (24 Apr 2022 06:45) (144/96 - 181/78)  BP(mean): 112 (24 Apr 2022 06:45) (97 - 117)  RR: 14 (24 Apr 2022 06:45) (2 - 17)  SpO2: 96% (24 Apr 2022 06:45) (94% - 99%)  I&O's Detail    23 Apr 2022 07:01  -  24 Apr 2022 07:00  --------------------------------------------------------  IN:    Oral Fluid: 620 mL  Total IN: 620 mL    OUT:    Voided (mL): 600 mL  Total OUT: 600 mL    Total NET: 20 mL          CHEST TUBE:  no  KENNETH DRAIN:  no  EPICARDIAL WIRES: no  TIE DOWNS: no  UNDERWOOD: no        PHYSICAL EXAM:  GEN: NAD, looks comfortable  Psych: Mood appropriate  Neuro: A&Ox3.  No focal deficits.  Moving all extremities.   HEENT: No obvious abnormalities  CV: S1S2, regular, no murmurs appreciated.  No carotid bruits.  No JVD  Lungs: Clear B/L.  No wheezing, rales or rhonchi  ABD: Soft, non-tender, non-distended.  +Bowel sounds  EXT: Warm and well perfused.  No peripheral edema noted  Musculoskeletal: Moving all extremities with normal ROM, no joint swelling  PV: Pedal pulses palpable  Incision Sites: Bilateral groins with old hematoma, very soft, nontender to palpation        LABS:                        8.9    10.46 )-----------( 361      ( 24 Apr 2022 06:00 )             27.1       COUMADIN:  no    PT/INR - ( 24 Apr 2022 07:19 )   PT: 13.7 sec;   INR: 1.15          PTT - ( 24 Apr 2022 07:19 )  PTT:31.0 sec    04-24    133<L>  |  97  |  8   ----------------------------<  120<H>  3.7   |  25  |  0.67    Ca    9.2      24 Apr 2022 06:00  Mg     1.7     04-24    TPro  7.1  /  Alb  3.2<L>  /  TBili  0.3  /  DBili  x   /  AST  26  /  ALT  11  /  AlkPhos  91  04-22          MEDICATIONS  (STANDING):  aspirin enteric coated 81 milliGRAM(s) Oral daily  carvedilol 3.125 milliGRAM(s) Oral every 12 hours  clopidogrel Tablet 75 milliGRAM(s) Oral daily  furosemide    Tablet 20 milliGRAM(s) Oral daily  heparin   Injectable 5000 Unit(s) SubCutaneous every 12 hours  hydrALAZINE Injectable 5 milliGRAM(s) IV Push once  iron sucrose IVPB 100 milliGRAM(s) IV Intermittent every 24 hours  NIFEdipine XL 90 milliGRAM(s) Oral daily  pantoprazole    Tablet 40 milliGRAM(s) Oral before breakfast  sodium chloride 0.9% lock flush 3 milliLiter(s) IV Push every 8 hours    MEDICATIONS  (PRN):  acetaminophen     Tablet .. 650 milliGRAM(s) Oral every 6 hours PRN Severe Pain (7 - 10)        RADIOLOGY & ADDITIONAL TESTS:

## 2022-04-24 NOTE — PROGRESS NOTE ADULT - ASSESSMENT
81 year old Slovenian speaking female with newly diagnosed severe AS, presented to her cardiologist, Dr. Go Curiel,with complaints of worsening ROE, fatigue, dizziness, near syncope, and reduced exercise capacity. She can walk 1/2 block before needing to stop and rest. She also admits to occasional chest tightness, described as pressure. An echocardiogram preformed on 3/7/22 revealed severe AS with peak velocity 4m.s and ROSAURA 0.8 cm 2. Patient was referred to [facility], Dr. Mcdermott, for further evaluation of her AS.  On 4/20/20 patient underwent TF TAVR, no rhythm issues intraop.  Patient brought to [facility] for postop recovery.  Patient had rodriguez placed for retention.  POD 1 rodriguez removed, echo performed see results below, Nifedipine and coreg restarted.  Patient discharged on POD 1. Patient called answering service on 4/22/22 complaining of chest pressure and shortness of breath.  She was able to speak in full sentences.  She was directed to come to office for evaluation  Patient's niece called EMS for transport and patient was brought to ED for evaluation.  Patient on arrival to ED showed no signs of distress.  Patient reports her symptoms are same as prior to TAVR. Admitted to [facility] for monitoring. She was found to be anemic, but was not symptomatic (not orthostatic, hypotensive, or tachycardic). Started on IV iron infusions and closely monitored.    A/P:    #Neurovascular:   - No delirium. Pain well controlled with current regimen.  - No active issues    #Cardiovascular:   - POD 3 TAVR  - Continue Coreg 3.125 q12h, Procardial XL 90  - Continue ASA and Plavix    #Respiratory:   - 02 Sat = 98% on 2L NC. Kept on for comfort.  - Encourage C+DB and Use of IS 10x / hr while awake.  - CXR stable    #GI:   - Stable.  - Tolerating DASH diet  - Protonix for GI prophylaxis    #Renal/:  - BUN/Cr stable at 10/0.75  - Lasix 20 PO daily  - Continue to monitor renal function.  - Monitor I/O's  - Replete electrolytes PRN    # Endocrine:    - No active issues  - No hx DM or thyroid disease    # Hematologic:  - H/H 7.3/22.1 with no obvious signs of bleeding. Groins soft  - CT abdomen/pelvis done 4/23, RP bleed ruled out  - Continue IV iron infusions daily  - Threshold for transfusion - hemoglobin of 7 per Dr. Mcdermott    #ID:  - Pt remains afebrile with no elevation in WBC or signs of infection  - Continue to monitor CBC  - Observe for SIRS/Sepsis Syndrome.    #Prophylaxis:  - DVT prophylaxis with 5000 SubQ Heparin q12h  - SCD's    #Disposition:  - Home when medically appropriate.  
81 year old Greenlandic speaking female with newly diagnosed severe AS, presented to her cardiologist, Dr. Go Curiel,with complaints of worsening ROE, fatigue, dizziness, near syncope, and reduced exercise capacity. She can walk 1/2 block before needing to stop and rest. She also admits to occasional chest tightness, described as pressure. An echocardiogram preformed on 3/7/22 revealed severe AS with peak velocity 4m.s and ROSAURA 0.8 cm 2. Patient was referred to [facility], Dr. Mcdermott, for further evaluation of her AS.  On 4/20/20 patient underwent TF TAVR, no rhythm issues intraop.  Patient brought to [facility] for postop recovery.  Patient had rodriguez placed for retention.  POD 1 rodriguez removed, echo performed see results below, Nifedipine and coreg restarted.  Patient discharged on POD 1. Patient called answering service on 4/22/22 complaining of chest pressure and shortness of breath.  She was able to speak in full sentences.  She was directed to come to office for evaluation  Patient's niece called EMS for transport and patient was brought to ED for evaluation.  Patient on arrival to ED showed no signs of distress.  Patient reports her symptoms are same as prior to TAVR. Admitted to [facility] for monitoring. She was found to be anemic, but was not symptomatic (not orthostatic, hypotensive, or tachycardic). Started on IV iron infusions and closely monitored. H/H improved. Patient's symptoms improved. Per Dr. Hinojosa, patient medically stable for discharge home today, 4/24/22 with family support.      Being discharged on PO iron and miralax for anemia      Appointments:  Mateo Mcdermott)  Cardiology; Interventional Cardiology  [address] [place] [address]  Phone: [phone]  Fax: [phone]  Scheduled Appointment: 05/02/2022 11:15 AM    Go Curiel)  Cardiovascular Medicine  [address] [place] [address]  Phone: [phone]  Fax: [phone]  Follow Up Time: 1 week

## 2022-04-24 NOTE — DISCHARGE NOTE PROVIDER - CARE PROVIDER_API CALL
Mateo Mcdermott (MD)  Cardiology; Interventional Cardiology  [address] [place] [address]  Phone: [phone]  Fax: [phone]  Scheduled Appointment: 05/02/2022 11:15 AM    Go Curiel)  Cardiovascular Medicine  [address] [place] [address]  Phone: [phone]  Fax: [phone]  Follow Up Time: 1 week

## 2022-04-24 NOTE — DISCHARGE NOTE PROVIDER - NSDCMRMEDTOKEN_GEN_ALL_CORE_FT
acetaminophen 325 mg oral tablet: 2 tab(s) orally every 6 hours, As needed, Mild Pain (1 - 3)  aspirin 81 mg oral delayed release tablet: 1 tab(s) orally once a day  carvedilol 3.125 mg oral tablet: 1 tab(s) orally every 12 hours  clopidogrel 75 mg oral tablet: 1 tab(s) orally once a day  ferrous sulfate 325 mg (65 mg elemental iron) oral delayed release tablet: 1 tab(s) orally once a day   NIFEdipine 90 mg oral tablet, extended release: 1 tab(s) orally once a day  pantoprazole 40 mg oral delayed release tablet: 1 tab(s) orally once a day (before a meal)  polyethylene glycol 3350 oral powder for reconstitution: 17 gram(s) orally once a day, As Needed Constipation. Hold for loose stool

## 2022-04-24 NOTE — DISCHARGE NOTE PROVIDER - NSDCFUADDINST_GEN_ALL_CORE_FT
-Walk daily as tolerated and use your incentive spirometer 10 times every hour while you are awake.     -Please weigh yourself daily. If you notice over a 3 pound weight gain in 3 days, this is a sign you are likely retaining too much fluid. It is imperative you call our right away with unexplained rapid weight gain.      -Please continue to wear the compression stockings given to you in the hospital at home. This is a way to prevent fluid from building up in your legs.     -No driving or strenuous activity/exercise until cleared by your surgeon.    -Gently clean your incisions with unscented/antibacterial soap and water, pat dry.  You may leave them open to air.    -Call your doctor if you have shortness of breath, chest pain not relieved by pain medication, dizziness, fever >100.5, or increased redness or drainage from incisions.

## 2022-04-25 ENCOUNTER — APPOINTMENT (OUTPATIENT)
Dept: CARE COORDINATION | Facility: HOME HEALTH | Age: 82
End: 2022-04-25

## 2022-04-25 VITALS — BODY MASS INDEX: 30.93 KG/M2 | WEIGHT: 148 LBS

## 2022-04-25 DIAGNOSIS — Z95.2 PRESENCE OF PROSTHETIC HEART VALVE: ICD-10-CM

## 2022-04-25 RX ORDER — PANTOPRAZOLE 40 MG/1
40 TABLET, DELAYED RELEASE ORAL
Qty: 30 | Refills: 0 | Status: ACTIVE | COMMUNITY
Start: 2022-04-21

## 2022-04-25 RX ORDER — ACETAMINOPHEN 325 MG/1
325 TABLET ORAL
Refills: 0 | Status: ACTIVE | COMMUNITY

## 2022-04-25 RX ORDER — CHLORHEXIDINE GLUCONATE 4 %
325 (65 FE) LIQUID (ML) TOPICAL
Refills: 0 | Status: ACTIVE | COMMUNITY

## 2022-04-25 RX ORDER — CLOPIDOGREL BISULFATE 75 MG/1
75 TABLET, FILM COATED ORAL
Qty: 30 | Refills: 0 | Status: ACTIVE | COMMUNITY
Start: 2022-04-21

## 2022-04-25 NOTE — HISTORY OF PRESENT ILLNESS
[Home] : at home, [unfilled] , at the time of the visit. [Other Location: e.g. Home (Enter Location, City,State)___] : at [unfilled] [Family Member] : family member [Verbal consent obtained from patient] : the patient, [unfilled] [FreeTextEntry1] : \par This pt. is enrolled in the Follow Your Heart (FYH) program through [employer]. Ms. BORGES is a pt. of Dr. Mcdermott s/p TAVR on 4/20. Discharged home from [facility] on 4/21.\par \par Pt. called for post-hospitalization transitional care management and post-surgical follow-up.  utilized ID # [id number]. She reports intermittent SOB on exertion which is improving since hospital discharge. She also reports mild BLE edema which is improved w/ use of compression stockings. Pt has all medications per DC instructions and is taking them as prescribed. MCOT intact. Denies fever, CP or palpitations. VN services in place.

## 2022-04-25 NOTE — ASSESSMENT
[FreeTextEntry1] : Pt. is an 81 year old female with newly diagnosed severe AS. On 4/20/20 she underwent TF TAVR, no rhythm issues intraop. Postop pt had rodriguez placed for retention. POD 1 rodriguez removed. Patient discharged on POD 1. Patient returned to [facility] ER on 4/22/22 complaining of chest pressure and shortness of breath. She was found to be anemic, but was not symptomatic (not orthostatic, hypotensive, or tachycardic). She was started on IV iron infusions w/ improvement in H/H. Pt. discharged home on PO Iron. \par \par Pt. now recovering well s/p TAVR.

## 2022-04-25 NOTE — REVIEW OF SYSTEMS
[Lower Ext Edema] : lower extremity edema [SOB on Exertion] : shortness of breath during exertion [Fever] : no fever [Feeling Poorly] : not feeling poorly [Heart Rate Is Slow] : the heart rate was not slow [Heart Rate Is Fast] : the heart rate was not fast [Chest Pain] : no chest pain [Palpitations] : no palpitations [Shortness Of Breath] : no shortness of breath [Cough] : no cough [Abdominal Pain] : no abdominal pain [Constipation] : no constipation [Dizziness] : no dizziness

## 2022-04-26 DIAGNOSIS — I35.0 NONRHEUMATIC AORTIC (VALVE) STENOSIS: ICD-10-CM

## 2022-04-26 DIAGNOSIS — I25.10 ATHEROSCLEROTIC HEART DISEASE OF NATIVE CORONARY ARTERY WITHOUT ANGINA PECTORIS: ICD-10-CM

## 2022-04-26 DIAGNOSIS — R94.39 ABNORMAL RESULT OF OTHER CARDIOVASCULAR FUNCTION STUDY: ICD-10-CM

## 2022-04-29 DIAGNOSIS — R33.9 RETENTION OF URINE, UNSPECIFIED: ICD-10-CM

## 2022-04-29 DIAGNOSIS — H40.9 UNSPECIFIED GLAUCOMA: ICD-10-CM

## 2022-04-29 DIAGNOSIS — I35.0 NONRHEUMATIC AORTIC (VALVE) STENOSIS: ICD-10-CM

## 2022-04-29 DIAGNOSIS — I11.0 HYPERTENSIVE HEART DISEASE WITH HEART FAILURE: ICD-10-CM

## 2022-04-29 DIAGNOSIS — N20.0 CALCULUS OF KIDNEY: ICD-10-CM

## 2022-04-29 DIAGNOSIS — I50.32 CHRONIC DIASTOLIC (CONGESTIVE) HEART FAILURE: ICD-10-CM

## 2022-04-29 DIAGNOSIS — Z00.6 ENCOUNTER FOR EXAMINATION FOR NORMAL COMPARISON AND CONTROL IN CLINICAL RESEARCH PROGRAM: ICD-10-CM

## 2022-04-29 DIAGNOSIS — I25.119 ATHEROSCLEROTIC HEART DISEASE OF NATIVE CORONARY ARTERY WITH UNSPECIFIED ANGINA PECTORIS: ICD-10-CM

## 2022-05-02 ENCOUNTER — APPOINTMENT (OUTPATIENT)
Dept: CARDIOTHORACIC SURGERY | Facility: CLINIC | Age: 82
End: 2022-05-02
Payer: MEDICARE

## 2022-05-02 PROCEDURE — 99214 OFFICE O/P EST MOD 30 MIN: CPT

## 2022-05-03 VITALS
TEMPERATURE: 98.9 F | RESPIRATION RATE: 16 BRPM | WEIGHT: 132 LBS | SYSTOLIC BLOOD PRESSURE: 157 MMHG | BODY MASS INDEX: 30.55 KG/M2 | DIASTOLIC BLOOD PRESSURE: 70 MMHG | HEIGHT: 55 IN | OXYGEN SATURATION: 98 % | HEART RATE: 72 BPM

## 2022-05-03 RX ORDER — FUROSEMIDE 20 MG/1
20 TABLET ORAL DAILY
Refills: 0 | Status: ACTIVE | COMMUNITY

## 2022-05-03 RX ORDER — CARVEDILOL 6.25 MG/1
6.25 TABLET, FILM COATED ORAL TWICE DAILY
Qty: 60 | Refills: 0 | Status: ACTIVE | COMMUNITY
Start: 2022-04-21

## 2022-05-06 DIAGNOSIS — R07.9 CHEST PAIN, UNSPECIFIED: ICD-10-CM

## 2022-05-06 DIAGNOSIS — H40.9 UNSPECIFIED GLAUCOMA: ICD-10-CM

## 2022-05-06 DIAGNOSIS — D64.9 ANEMIA, UNSPECIFIED: ICD-10-CM

## 2022-05-06 DIAGNOSIS — Z95.3 PRESENCE OF XENOGENIC HEART VALVE: ICD-10-CM

## 2022-05-06 DIAGNOSIS — Z79.02 LONG TERM (CURRENT) USE OF ANTITHROMBOTICS/ANTIPLATELETS: ICD-10-CM

## 2022-05-06 DIAGNOSIS — M19.90 UNSPECIFIED OSTEOARTHRITIS, UNSPECIFIED SITE: ICD-10-CM

## 2022-05-06 DIAGNOSIS — I11.0 HYPERTENSIVE HEART DISEASE WITH HEART FAILURE: ICD-10-CM

## 2022-05-06 DIAGNOSIS — Z79.82 LONG TERM (CURRENT) USE OF ASPIRIN: ICD-10-CM

## 2022-05-06 DIAGNOSIS — E87.1 HYPO-OSMOLALITY AND HYPONATREMIA: ICD-10-CM

## 2022-05-06 DIAGNOSIS — E78.5 HYPERLIPIDEMIA, UNSPECIFIED: ICD-10-CM

## 2022-05-06 DIAGNOSIS — I50.31 ACUTE DIASTOLIC (CONGESTIVE) HEART FAILURE: ICD-10-CM

## 2022-05-11 NOTE — HISTORY OF PRESENT ILLNESS
[FreeTextEntry1] : \par 81 year old Chilean-speaking Female with aortic stenosis who underwent a transfemoral TAVR on 4/20/2022 (Evolut Pro Plus, 23mm) who was readmitted for shortness of breath on 4/22/2022 who presents for follow up after discharge.\par \par The procedure was uncomplicated.  The patient needed a rodriguez for urinary retention.   The patient was discharged home on post op day 1.  The patient came to the ED on 4/22/2022 with shortness of breath.  She stated that the symptoms were the same as prior to the procedure.  The patient was observed and discharged home on 4/24/2022.  \par \par Since discharge, the patient states she continues to feel the same.  She complains of some dizziness, LE edema, and shortness of breath with exertion.  Her main complaint is stomach pains and lack of energy.  She denies chest pain, shortness of breath at rest, palpitations, syncope, orthopnea, or PND  \par

## 2022-05-11 NOTE — PHYSICAL EXAM
[Well Developed] : well developed [Well Nourished] : well nourished [No Acute Distress] : no acute distress [Normal Conjunctiva] : normal conjunctiva [Normal S1, S2] : normal S1, S2 [Clear Lung Fields] : clear lung fields [Good Air Entry] : good air entry [No Respiratory Distress] : no respiratory distress  [Soft] : abdomen soft [Non Tender] : non-tender [No Masses/organomegaly] : no masses/organomegaly [Normal Bowel Sounds] : normal bowel sounds [Normal Gait] : normal gait [No Cyanosis] : no cyanosis [No Clubbing] : no clubbing [No Varicosities] : no varicosities [Moves all extremities] : moves all extremities [No Focal Deficits] : no focal deficits [Normal Speech] : normal speech [Alert and Oriented] : alert and oriented [Normal memory] : normal memory [de-identified] : pleasant elderly woman [de-identified] : soft CHELO heard [de-identified] : 1+ LE edema [de-identified] : groins are soft, nontender, no hematomas presents

## 2022-05-11 NOTE — REVIEW OF SYSTEMS
[SOB] : shortness of breath [Dyspnea on exertion] : dyspnea during exertion [Lower Ext Edema] : lower extremity edema [Negative] : Heme/Lymph [Feeling Fatigued] : feeling fatigued [Dizziness] : dizziness [Chest Discomfort] : no chest discomfort [Leg Claudication] : no intermittent leg claudication [Palpitations] : no palpitations [Orthopnea] : no orthopnea [PND] : no PND [Syncope] : no syncope

## 2022-05-20 ENCOUNTER — TRANSCRIPTION ENCOUNTER (OUTPATIENT)
Age: 82
End: 2022-05-20

## 2022-05-23 ENCOUNTER — APPOINTMENT (OUTPATIENT)
Dept: CARDIOTHORACIC SURGERY | Facility: CLINIC | Age: 82
End: 2022-05-23
Payer: MEDICARE

## 2022-05-23 ENCOUNTER — OUTPATIENT (OUTPATIENT)
Dept: OUTPATIENT SERVICES | Facility: HOSPITAL | Age: 82
LOS: 1 days | End: 2022-05-23
Payer: MEDICARE

## 2022-05-23 VITALS
HEART RATE: 79 BPM | DIASTOLIC BLOOD PRESSURE: 66 MMHG | SYSTOLIC BLOOD PRESSURE: 148 MMHG | HEIGHT: 55 IN | TEMPERATURE: 96.1 F | RESPIRATION RATE: 17 BRPM | OXYGEN SATURATION: 97 % | WEIGHT: 128 LBS | BODY MASS INDEX: 29.62 KG/M2

## 2022-05-23 DIAGNOSIS — Z98.891 HISTORY OF UTERINE SCAR FROM PREVIOUS SURGERY: Chronic | ICD-10-CM

## 2022-05-23 DIAGNOSIS — Z96.7 PRESENCE OF OTHER BONE AND TENDON IMPLANTS: Chronic | ICD-10-CM

## 2022-05-23 DIAGNOSIS — I35.0 NONRHEUMATIC AORTIC (VALVE) STENOSIS: ICD-10-CM

## 2022-05-23 DIAGNOSIS — I50.32 CHRONIC DIASTOLIC (CONGESTIVE) HEART FAILURE: ICD-10-CM

## 2022-05-23 LAB
ALBUMIN SERPL ELPH-MCNC: 3.9 G/DL — SIGNIFICANT CHANGE UP (ref 3.3–5)
ALP SERPL-CCNC: 105 U/L — SIGNIFICANT CHANGE UP (ref 40–120)
ALT FLD-CCNC: 9 U/L — LOW (ref 10–45)
ANION GAP SERPL CALC-SCNC: 14 MMOL/L — SIGNIFICANT CHANGE UP (ref 5–17)
AST SERPL-CCNC: 21 U/L — SIGNIFICANT CHANGE UP (ref 10–40)
BASOPHILS # BLD AUTO: 0.07 K/UL — SIGNIFICANT CHANGE UP (ref 0–0.2)
BASOPHILS NFR BLD AUTO: 1 % — SIGNIFICANT CHANGE UP (ref 0–2)
BILIRUB SERPL-MCNC: 0.2 MG/DL — SIGNIFICANT CHANGE UP (ref 0.2–1.2)
BUN SERPL-MCNC: 12 MG/DL — SIGNIFICANT CHANGE UP (ref 7–23)
CALCIUM SERPL-MCNC: 9.2 MG/DL — SIGNIFICANT CHANGE UP (ref 8.4–10.5)
CHLORIDE SERPL-SCNC: 98 MMOL/L — SIGNIFICANT CHANGE UP (ref 96–108)
CO2 SERPL-SCNC: 25 MMOL/L — SIGNIFICANT CHANGE UP (ref 22–31)
CREAT SERPL-MCNC: 0.98 MG/DL — SIGNIFICANT CHANGE UP (ref 0.5–1.3)
EGFR: 57 ML/MIN/1.73M2 — LOW
EOSINOPHIL # BLD AUTO: 0.23 K/UL — SIGNIFICANT CHANGE UP (ref 0–0.5)
EOSINOPHIL NFR BLD AUTO: 3.3 % — SIGNIFICANT CHANGE UP (ref 0–6)
GLUCOSE SERPL-MCNC: 147 MG/DL — HIGH (ref 70–99)
HCT VFR BLD CALC: 32.1 % — LOW (ref 34.5–45)
HGB BLD-MCNC: 10.7 G/DL — LOW (ref 11.5–15.5)
IMM GRANULOCYTES NFR BLD AUTO: 0.3 % — SIGNIFICANT CHANGE UP (ref 0–1.5)
LYMPHOCYTES # BLD AUTO: 2.11 K/UL — SIGNIFICANT CHANGE UP (ref 1–3.3)
LYMPHOCYTES # BLD AUTO: 30.1 % — SIGNIFICANT CHANGE UP (ref 13–44)
MCHC RBC-ENTMCNC: 29.3 PG — SIGNIFICANT CHANGE UP (ref 27–34)
MCHC RBC-ENTMCNC: 33.3 GM/DL — SIGNIFICANT CHANGE UP (ref 32–36)
MCV RBC AUTO: 87.9 FL — SIGNIFICANT CHANGE UP (ref 80–100)
MONOCYTES # BLD AUTO: 0.62 K/UL — SIGNIFICANT CHANGE UP (ref 0–0.9)
MONOCYTES NFR BLD AUTO: 8.9 % — SIGNIFICANT CHANGE UP (ref 2–14)
NEUTROPHILS # BLD AUTO: 3.95 K/UL — SIGNIFICANT CHANGE UP (ref 1.8–7.4)
NEUTROPHILS NFR BLD AUTO: 56.4 % — SIGNIFICANT CHANGE UP (ref 43–77)
NRBC # BLD: 0 /100 WBCS — SIGNIFICANT CHANGE UP (ref 0–0)
PLATELET # BLD AUTO: 314 K/UL — SIGNIFICANT CHANGE UP (ref 150–400)
POTASSIUM SERPL-MCNC: 4.2 MMOL/L — SIGNIFICANT CHANGE UP (ref 3.5–5.3)
POTASSIUM SERPL-SCNC: 4.2 MMOL/L — SIGNIFICANT CHANGE UP (ref 3.5–5.3)
PROT SERPL-MCNC: 8.4 G/DL — HIGH (ref 6–8.3)
RBC # BLD: 3.65 M/UL — LOW (ref 3.8–5.2)
RBC # FLD: 13.8 % — SIGNIFICANT CHANGE UP (ref 10.3–14.5)
SODIUM SERPL-SCNC: 137 MMOL/L — SIGNIFICANT CHANGE UP (ref 135–145)
WBC # BLD: 7 K/UL — SIGNIFICANT CHANGE UP (ref 3.8–10.5)
WBC # FLD AUTO: 7 K/UL — SIGNIFICANT CHANGE UP (ref 3.8–10.5)

## 2022-05-23 PROCEDURE — 80053 COMPREHEN METABOLIC PANEL: CPT

## 2022-05-23 PROCEDURE — 36415 COLL VENOUS BLD VENIPUNCTURE: CPT

## 2022-05-23 PROCEDURE — 99213 OFFICE O/P EST LOW 20 MIN: CPT

## 2022-05-23 PROCEDURE — 85025 COMPLETE CBC W/AUTO DIFF WBC: CPT

## 2022-05-23 RX ORDER — CLOPIDOGREL BISULFATE 75 MG/1
75 TABLET, FILM COATED ORAL DAILY
Qty: 30 | Refills: 3 | Status: ACTIVE | COMMUNITY
Start: 2022-05-23 | End: 1900-01-01

## 2022-05-24 DIAGNOSIS — I35.0 NONRHEUMATIC AORTIC (VALVE) STENOSIS: ICD-10-CM

## 2022-05-24 PROBLEM — I50.32 CHRONIC DIASTOLIC CONGESTIVE HEART FAILURE: Status: ACTIVE | Noted: 2022-03-29

## 2022-05-25 RX ORDER — SIMETHICONE 80 MG/1
80 TABLET, CHEWABLE ORAL
Qty: 30 | Refills: 1 | Status: ACTIVE | COMMUNITY
Start: 2022-05-25 | End: 1900-01-01

## 2022-06-12 NOTE — PHYSICAL EXAM
[Well Developed] : well developed [Well Nourished] : well nourished [No Acute Distress] : no acute distress [Normal Conjunctiva] : normal conjunctiva [Normal S1, S2] : normal S1, S2 [Clear Lung Fields] : clear lung fields [Good Air Entry] : good air entry [No Respiratory Distress] : no respiratory distress  [Soft] : abdomen soft [Non Tender] : non-tender [No Masses/organomegaly] : no masses/organomegaly [Normal Bowel Sounds] : normal bowel sounds [Normal Gait] : normal gait [No Cyanosis] : no cyanosis [No Clubbing] : no clubbing [No Varicosities] : no varicosities [Moves all extremities] : moves all extremities [No Focal Deficits] : no focal deficits [Normal Speech] : normal speech [Alert and Oriented] : alert and oriented [Normal memory] : normal memory [de-identified] : pleasant elderly woman [de-identified] : 1+ LE edema [de-identified] : soft CHELO heard

## 2022-06-12 NOTE — HISTORY OF PRESENT ILLNESS
[FreeTextEntry1] : \par 82 year old Saudi Arabian-speaking Female with aortic stenosis who underwent a transfemoral TAVR on 4/20/2022 (Evolut Pro Plus, 23mm) who was readmitted for shortness of breath on 4/22/2022 who presents for one month follow up.\par \par Since the last visit, the patient states she is feeling a little better since the procedure.  She complains of shortness of breath with exertion but lessened since the procedure.  Her main complaint is stomach pains and gas.  She denies chest pain, shortness of breath at rest, dizziness, syncope, orthopnea, or PND.  She does present with some LE edema.  \par

## 2022-06-12 NOTE — REVIEW OF SYSTEMS
[Dyspnea on exertion] : dyspnea during exertion [Lower Ext Edema] : lower extremity edema [Negative] : Neurological [Chest Discomfort] : no chest discomfort [Leg Claudication] : no intermittent leg claudication [Palpitations] : no palpitations [Orthopnea] : no orthopnea [Syncope] : no syncope [PND] : no PND [FreeTextEntry7] : gas pains

## 2024-08-22 NOTE — PATIENT PROFILE ADULT - BRAND OF FIRST COVID-19 BOOSTER
17.3   6.67  )-----------( 8        ( 22 Aug 2024 12:27 )             49.3       08-22    140  |  96<L>  |  9<L>  ----------------------------<  135<H>  3.1<L>   |  29  |  0.9    Ca    8.6      22 Aug 2024 10:50    TPro  7.1  /  Alb  4.1  /  TBili  2.0<H>  /  DBili  x   /  AST  173<H>  /  ALT  100<H>  /  AlkPhos  119<H>  08-22              Urinalysis Basic - ( 22 Aug 2024 10:50 )    Color: x / Appearance: x / SG: x / pH: x  Gluc: 135 mg/dL / Ketone: x  / Bili: x / Urobili: x   Blood: x / Protein: x / Nitrite: x   Leuk Esterase: x / RBC: x / WBC x   Sq Epi: x / Non Sq Epi: x / Bacteria: x            Lactate Trend            CAPILLARY BLOOD GLUCOSE
Pt does not want booster at this time.

## (undated) DEVICE — MANIFOLD ANGIO AUTOMD TRNDCR

## (undated) DEVICE — CATH CV TRAY INSR ST UNIV

## (undated) DEVICE — DRSG TRACH DRAINAGE 4X4

## (undated) DEVICE — SUT TICRON 2-0 36" CV-316 DA

## (undated) DEVICE — ELCTR ZOLL DEFIBRILLATOR PAD NO REPLACEMENT

## (undated) DEVICE — SUT NUROLON 1 18" OS-8 (POP-OFF)

## (undated) DEVICE — STOPCOCK 3-WAY MED PRESSURE LEFT HANDED ROTATING MALE ADAPTER FEMALE FIT 400 PSI

## (undated) DEVICE — SUMP INTRACARDIAC/PERICARDIAL 20FR 1/4" ADULT

## (undated) DEVICE — PACK HYBRID LHH

## (undated) DEVICE — PLASTIC SOLUTION BOWL 160Z

## (undated) DEVICE — TUBING SUCTION NONCONDUCTIVE 6MM X 12FT

## (undated) DEVICE — SUT STAINLESS STEEL 6 4-18" CCS

## (undated) DEVICE — SUT PROLENE 6-0 30" RB-2

## (undated) DEVICE — SUT HOLDER INSERT FOR OCTOBASE STERNAL RETRACTOR

## (undated) DEVICE — SYS DEL CONTROLLER ANGIOTOUCH

## (undated) DEVICE — SUT VICRYL 1 36" CTX UNDYED

## (undated) DEVICE — Device

## (undated) DEVICE — SYR MED A2000 SYRINGE KIT ACIST REUS

## (undated) DEVICE — TOURNIQUET SET 12FR (1 RED, 1 BLUE, 3 CLEAR, 1 SNARE) 7"

## (undated) DEVICE — PREP SCRUB BRUSH W CHG 4%

## (undated) DEVICE — SUT DOUBLE 6 WIRE STERNAL

## (undated) DEVICE — SUT VICRYL 4-0 18" PS-2 UNDYED

## (undated) DEVICE — DRAPE PROBE COVER 5" X 96"

## (undated) DEVICE — SUT PROLENE 5-0 24" RB-1

## (undated) DEVICE — SUT SILK 5-0 60" TIES

## (undated) DEVICE — PACK OPEN HEART LNX

## (undated) DEVICE — CATH CARDIAC RT CUSET 601201319

## (undated) DEVICE — DRAPE ULTRASOUND TRANSDUCER COVER

## (undated) DEVICE — DRAPE OR CAMERA COVER

## (undated) DEVICE — SUT PROLENE 3-0 36" SH-1

## (undated) DEVICE — SUT VICRYL 2-0 27" CT-1

## (undated) DEVICE — SUT SILK 2-0 18" SH (POP-OFF)

## (undated) DEVICE — SHIELD CONTAMINATION AUTO 80CM

## (undated) DEVICE — DRAPE SURGICAL #1010

## (undated) DEVICE — SUT PLEDGET SOFT MEDIUM 1/4" X 1/8" X 1/16" X6

## (undated) DEVICE — INFLATOR ENCORE 26

## (undated) DEVICE — CATH NG SALEM SUMP 16FR

## (undated) DEVICE — TUBING EXTENSION HI PRESSURE FLEX 48"

## (undated) DEVICE — PACK PROC CV DRAPE

## (undated) DEVICE — BAND RADIAL COMPRESSION DEVICE REG 24CM

## (undated) DEVICE — FOLEY TRAY 16FR 5CC LF LUBRISIL ADVANCE TEMP CLOSED

## (undated) DEVICE — CHEST DRAIN OASIS DRY SUCTION WATER SEAL

## (undated) DEVICE — DRAPE SLUSH / WARMER 44 X 66"

## (undated) DEVICE — RIGID ADULT SUCKER

## (undated) DEVICE — SUT STAINLESS STEEL 7 4-18" CCS

## (undated) DEVICE — POSITIONER FOAM EGG CRATE ULNAR 2PCS (PINK)

## (undated) DEVICE — NDL PERCU ECHOTIP 21G X 4CM

## (undated) DEVICE — MARKING PEN W RULER

## (undated) DEVICE — DRSG MEPILEX 10 X 25CM (4 X 10") AG

## (undated) DEVICE — SUT PROLENE 4-0 36" RB-1

## (undated) DEVICE — WARMING BLANKET FULL UNDERBODY

## (undated) DEVICE — SUT VICRYL 0 27" CT

## (undated) DEVICE — PACING CABLE (BROWN) A/V TEMP SCREW DOWN 12FT

## (undated) DEVICE — DRSG BIOPATCH DISK W CHG 1" W 4.0MM HOLE

## (undated) DEVICE — SUT PLEDGET 9MM X 4MM X 1.5MM

## (undated) DEVICE — DRAPE IOBAN 33" X 23"

## (undated) DEVICE — SUT ETHIBOND 3-0 36" RB-1